# Patient Record
Sex: MALE | Race: WHITE | Employment: FULL TIME | URBAN - METROPOLITAN AREA
[De-identification: names, ages, dates, MRNs, and addresses within clinical notes are randomized per-mention and may not be internally consistent; named-entity substitution may affect disease eponyms.]

---

## 2017-09-12 DIAGNOSIS — M25.511 ACUTE PAIN OF RIGHT SHOULDER: Primary | ICD-10-CM

## 2017-10-04 ENCOUNTER — OFFICE VISIT (OUTPATIENT)
Dept: ORTHOPEDIC SURGERY | Age: 32
End: 2017-10-04

## 2017-10-04 VITALS
SYSTOLIC BLOOD PRESSURE: 139 MMHG | DIASTOLIC BLOOD PRESSURE: 91 MMHG | BODY MASS INDEX: 38.54 KG/M2 | WEIGHT: 310 LBS | HEIGHT: 75 IN | HEART RATE: 82 BPM

## 2017-10-04 DIAGNOSIS — M12.511: ICD-10-CM

## 2017-10-04 DIAGNOSIS — M24.111 LABRAL TEAR OF SHOULDER, DEGENERATIVE, RIGHT: Primary | ICD-10-CM

## 2017-10-04 PROCEDURE — 99214 OFFICE O/P EST MOD 30 MIN: CPT | Performed by: ORTHOPAEDIC SURGERY

## 2017-10-04 PROCEDURE — L3670 SO ACRO/CLAV CAN WEB PRE OTS: HCPCS | Performed by: ORTHOPAEDIC SURGERY

## 2017-10-04 PROCEDURE — G8484 FLU IMMUNIZE NO ADMIN: HCPCS | Performed by: ORTHOPAEDIC SURGERY

## 2017-10-04 PROCEDURE — G8417 CALC BMI ABV UP PARAM F/U: HCPCS | Performed by: ORTHOPAEDIC SURGERY

## 2017-10-04 PROCEDURE — G8427 DOCREV CUR MEDS BY ELIG CLIN: HCPCS | Performed by: ORTHOPAEDIC SURGERY

## 2017-10-04 PROCEDURE — 73030 X-RAY EXAM OF SHOULDER: CPT | Performed by: ORTHOPAEDIC SURGERY

## 2017-10-04 PROCEDURE — 1036F TOBACCO NON-USER: CPT | Performed by: ORTHOPAEDIC SURGERY

## 2017-10-04 NOTE — MR AVS SNAPSHOT
Learn more at: [email or web address]             Medications and Orders      Your Current Medications Are              meloxicam (MOBIC) 15 MG tablet Take 1 tablet by mouth daily      Allergies           No Known Allergies      We Ordered/Performed the following           DJO ultrasling IV Shoulder Sling     Comments:    Patient was prescribed a DJO Ultrasling IV Shoulder Brace. The right shoulder will require stabilization / immobilization from this orthosis. The orthosis will assist in protecting the affected area, provide functional support and facilitate healing. The device was ordered and fit on 10/4/17. The patient was educated and fit by a healthcare professional with expert knowledge and specialization in brace application while under the direct supervision of the treating physician. Verbal and written instructions for the use of and application of this item were provided. They were instructed to contact the office immediately should the brace result in increased pain, decreased sensation, increased swelling or worsening of the condition. XR SHOULDER RIGHT (MIN 2 VIEWS)          Result Summary for XR SHOULDER RIGHT (MIN 2 VIEWS)      Result Information     Status          Final result (Exam End: 10/4/2017  2:08 PM)           10/4/2017  2:09 PM      Narrative & Impression           Radiology exam is complete. No Radiologist dictation. Please follow up with ordering provider. Additional Information        Basic Information     Date Of Birth Sex Race Ethnicity Preferred Language    1985 Male White Non-/Non  English      Problem List as of 10/4/2017  Date Reviewed: 10/4/2017          None      Preventive Care        Date Due    HIV screening is recommended for all people regardless of risk factors  aged 15-65 years at least once (lifetime) who have never been HIV tested.  2/10/1999

## 2017-10-04 NOTE — PROGRESS NOTES
with his previous x-ray last year it appears that the patient may have some prominence of hardware from his initial surgery protruding from his glenoid which is causing some traumatic wear on his humeral head. As such we have offered the patient various treatment options for his overall pain and symptoms. Given the patient's distance from our clinic he has opted not to have the three-part Supartz injection into the shoulder but has decided on having arthroscopic debridement, lysis of adhesions and possible removal of hardware and biceps tenodesis. The risks benefits and alternatives to surgery have been explained to the patient in great detail the patient stated a good understanding of everything was explained and we will schedule the patient for his procedure. Of note the patient will receive a Ultrasling today to be brought with him on his date of surgery. Orders Placed This Encounter   Procedures    XR SHOULDER RIGHT (MIN 2 VIEWS)     Order Specific Question:   Reason for exam:     Answer:   room 2    DJO ultrasling IV Shoulder Sling     Patient was prescribed a DJO Ultrasling IV Shoulder Brace. The right shoulder will require stabilization / immobilization from this orthosis. The orthosis will assist in protecting the affected area, provide functional support and facilitate healing. The device was ordered and fit on 10/4/17. The patient was educated and fit by a healthcare professional with expert knowledge and specialization in brace application while under the direct supervision of the treating physician. Verbal and written instructions for the use of and application of this item were provided. They were instructed to contact the office immediately should the brace result in increased pain, decreased sensation, increased swelling or worsening of the condition.        [address] [place] [address]  Date:    10/4/2017    This dictation was performed with a

## 2017-10-23 ENCOUNTER — TELEPHONE (OUTPATIENT)
Dept: ORTHOPEDIC SURGERY | Age: 32
End: 2017-10-23

## 2017-12-04 ENCOUNTER — TELEPHONE (OUTPATIENT)
Dept: ORTHOPEDIC SURGERY | Age: 32
End: 2017-12-04

## 2017-12-07 ENCOUNTER — HOSPITAL ENCOUNTER (OUTPATIENT)
Dept: PREADMISSION TESTING | Age: 32
Discharge: HOME | End: 2017-12-07
Attending: ORTHOPAEDIC SURGERY | Admitting: ORTHOPAEDIC SURGERY

## 2017-12-07 RX ORDER — CHLORHEXIDINE GLUCONATE 0.12 MG/ML
15 RINSE ORAL 2 TIMES DAILY
Status: CANCELLED | OUTPATIENT
Start: 2017-12-10

## 2017-12-07 RX ORDER — CHLORHEXIDINE GLUCONATE 0.12 MG/ML
15 RINSE ORAL 2 TIMES DAILY
Status: CANCELLED | OUTPATIENT
Start: 2017-12-10 | End: 2017-12-08

## 2017-12-07 NOTE — PROGRESS NOTES
PRE-OP INSTRUCTIONS FOR THE SURGICAL PATIENT YOU ARE UNABLE TO MAKE CONTACT FOR AN INTERVIEW:      1. Follow instructions for your ARRIVAL TIME as DIRECTED BY YOUR SURGEON. 2. Enter the MAIN entrance located on [facility] and report to the desk. 3. Bring your insurance & prescription card and photo ID with you. You may also be asked to pay a co-pay, as you may want to bring a check or credit card with you. 4. Leave all other valuables at home. 5. Arrange for someone to drive you home and be with you for the first 24 hours after discharge. 6. You must contact your surgeon for ALL medication instructions, especially if taking blood thinners, aspirin, or diabetic medication. 7. A Pre-op History and Physical for surgery MUST be completed by your Physician or an Urgent Care within 30 days of your procedure date. Please bring a copy with you on the day of your procedure and along with any other testing performed. 8. DO NOT EAT OR DRINK ANYTHING AFTER MIDNIGHT, including gum, candy, mints or ice chips   9. Dress in loose, comfortable clothing appropriate for redressing after your procedure. Do not wear jewelry (including body piercings), make-up, fingernail polish, lotion, powders or metal hairclips. Contacts will need to be removed prior to surgery. 10. If you use a CPAP, please bring it with you on the day of your procedure. 11. Do not shave or wax for 72 hours prior to procedure near your operative site  12. FOR WOMAN OF CHILDBEARING AGE ONLY- please bring a urine sample with you on day of surgery or make sure we can collect on arrival.    If you have further questions, you may contact us at [phone]    Left instructions on patient's voicemail. Stephie Pat. 12/7/2017 . 2:25 PM

## 2017-12-11 ENCOUNTER — HOSPITAL ENCOUNTER (OUTPATIENT)
Dept: SURGERY | Age: 32
Discharge: HOME | End: 2017-12-11
Admitting: ORTHOPAEDIC SURGERY

## 2017-12-11 VITALS
HEART RATE: 73 BPM | TEMPERATURE: 97.7 F | HEIGHT: 75 IN | BODY MASS INDEX: 38.54 KG/M2 | RESPIRATION RATE: 16 BRPM | DIASTOLIC BLOOD PRESSURE: 80 MMHG | SYSTOLIC BLOOD PRESSURE: 139 MMHG | OXYGEN SATURATION: 93 % | WEIGHT: 310 LBS

## 2017-12-11 RX ORDER — HYDRALAZINE HYDROCHLORIDE 20 MG/ML
5 INJECTION INTRAMUSCULAR; INTRAVENOUS EVERY 10 MIN PRN
Status: DISCONTINUED | OUTPATIENT
Start: 2017-12-11 | End: 2017-12-12 | Stop reason: HOSPADM

## 2017-12-11 RX ORDER — LABETALOL HYDROCHLORIDE 5 MG/ML
5 INJECTION, SOLUTION INTRAVENOUS EVERY 10 MIN PRN
Status: DISCONTINUED | OUTPATIENT
Start: 2017-12-11 | End: 2017-12-12 | Stop reason: HOSPADM

## 2017-12-11 RX ORDER — SODIUM CHLORIDE, SODIUM LACTATE, POTASSIUM CHLORIDE, CALCIUM CHLORIDE 600; 310; 30; 20 MG/100ML; MG/100ML; MG/100ML; MG/100ML
INJECTION, SOLUTION INTRAVENOUS CONTINUOUS
Status: DISCONTINUED | OUTPATIENT
Start: 2017-12-11 | End: 2017-12-12 | Stop reason: HOSPADM

## 2017-12-11 RX ORDER — FENTANYL CITRATE 50 UG/ML
100 INJECTION, SOLUTION INTRAMUSCULAR; INTRAVENOUS ONCE
Status: COMPLETED | OUTPATIENT
Start: 2017-12-11 | End: 2017-12-11

## 2017-12-11 RX ORDER — PROMETHAZINE HYDROCHLORIDE 25 MG/ML
6.25 INJECTION, SOLUTION INTRAMUSCULAR; INTRAVENOUS
Status: ACTIVE | OUTPATIENT
Start: 2017-12-11 | End: 2017-12-11

## 2017-12-11 RX ORDER — ROPIVACAINE HYDROCHLORIDE 5 MG/ML
30 INJECTION, SOLUTION EPIDURAL; INFILTRATION; PERINEURAL ONCE
Status: DISCONTINUED | OUTPATIENT
Start: 2017-12-11 | End: 2017-12-12 | Stop reason: HOSPADM

## 2017-12-11 RX ORDER — MIDAZOLAM HYDROCHLORIDE 1 MG/ML
2 INJECTION INTRAMUSCULAR; INTRAVENOUS ONCE
Status: COMPLETED | OUTPATIENT
Start: 2017-12-11 | End: 2017-12-11

## 2017-12-11 RX ORDER — FENTANYL CITRATE 50 UG/ML
25 INJECTION, SOLUTION INTRAMUSCULAR; INTRAVENOUS EVERY 5 MIN PRN
Status: DISCONTINUED | OUTPATIENT
Start: 2017-12-11 | End: 2017-12-12 | Stop reason: HOSPADM

## 2017-12-11 RX ORDER — MORPHINE SULFATE 2 MG/ML
2 INJECTION, SOLUTION INTRAMUSCULAR; INTRAVENOUS
Status: DISCONTINUED | OUTPATIENT
Start: 2017-12-11 | End: 2017-12-12 | Stop reason: HOSPADM

## 2017-12-11 RX ORDER — OXYCODONE HYDROCHLORIDE AND ACETAMINOPHEN 5; 325 MG/1; MG/1
1 TABLET ORAL EVERY 4 HOURS PRN
Status: DISCONTINUED | OUTPATIENT
Start: 2017-12-11 | End: 2017-12-12 | Stop reason: HOSPADM

## 2017-12-11 RX ORDER — ONDANSETRON 2 MG/ML
4 INJECTION INTRAMUSCULAR; INTRAVENOUS EVERY 6 HOURS PRN
Status: DISCONTINUED | OUTPATIENT
Start: 2017-12-11 | End: 2017-12-12 | Stop reason: HOSPADM

## 2017-12-11 RX ORDER — FENTANYL CITRATE 50 UG/ML
50 INJECTION, SOLUTION INTRAMUSCULAR; INTRAVENOUS EVERY 5 MIN PRN
Status: DISCONTINUED | OUTPATIENT
Start: 2017-12-11 | End: 2017-12-12 | Stop reason: HOSPADM

## 2017-12-11 RX ORDER — ONDANSETRON 2 MG/ML
4 INJECTION INTRAMUSCULAR; INTRAVENOUS
Status: ACTIVE | OUTPATIENT
Start: 2017-12-11 | End: 2017-12-11

## 2017-12-11 RX ADMIN — SODIUM CHLORIDE, SODIUM LACTATE, POTASSIUM CHLORIDE, CALCIUM CHLORIDE: 600; 310; 30; 20 INJECTION, SOLUTION INTRAVENOUS at 10:20

## 2017-12-11 RX ADMIN — FENTANYL CITRATE 100 MCG: 50 INJECTION, SOLUTION INTRAMUSCULAR; INTRAVENOUS at 10:51

## 2017-12-11 RX ADMIN — MIDAZOLAM HYDROCHLORIDE 2 MG: 1 INJECTION INTRAMUSCULAR; INTRAVENOUS at 10:52

## 2017-12-11 ASSESSMENT — PAIN SCALES - GENERAL
PAINLEVEL_OUTOF10: 0
PAINLEVEL_OUTOF10: 0
PAINLEVEL_OUTOF10: 1
PAINLEVEL_OUTOF10: 0

## 2017-12-11 ASSESSMENT — PAIN DESCRIPTION - FREQUENCY: FREQUENCY: CONTINUOUS

## 2017-12-11 ASSESSMENT — PAIN DESCRIPTION - ONSET: ONSET: ON-GOING

## 2017-12-11 ASSESSMENT — PAIN DESCRIPTION - PROGRESSION: CLINICAL_PROGRESSION: GRADUALLY IMPROVING

## 2017-12-11 ASSESSMENT — PAIN DESCRIPTION - DESCRIPTORS: DESCRIPTORS: TIGHTNESS

## 2017-12-11 ASSESSMENT — PAIN - FUNCTIONAL ASSESSMENT: PAIN_FUNCTIONAL_ASSESSMENT: 0-10

## 2017-12-11 ASSESSMENT — PAIN DESCRIPTION - LOCATION: LOCATION: FINGER (COMMENT WHICH ONE)

## 2017-12-11 ASSESSMENT — PAIN DESCRIPTION - ORIENTATION: ORIENTATION: RIGHT

## 2017-12-11 ASSESSMENT — PAIN DESCRIPTION - PAIN TYPE: TYPE: SURGICAL PAIN

## 2017-12-11 NOTE — PROGRESS NOTES
Patient admitted to PACU #9 from OR per stretcher at 1339 s/p  RIGHT SHOULDER EUA, DIAGNOSTIC ARTHROSCOPY, ARTHROSCOPIC DEBRIDEMENT, SUBACROMIAL DECOMPRESSION, OPEN BICEPS TENODESIS, LYSIS OF  ADHESIONS, REMOVAL OF SUTURE, ARTHROSCOPIC REMOVAL LOOSE BODIES, ARTHROSCOPIC PARTIAL SYNOVECTOMY. Report received at bedside in PACU per CRNA. Patient was reported to be hemodynamically stable during surgery with no complications reported. Patient connected to PACU monitoring equipment. IVF's infusing with site unremarkable. Patient arrived to PACU not responsive from anesthesia but with respirations easy, even and regular. No complaints of pain noted or verbalized. Right shoulder surgical dressing remains C,D,I with no drainage noted. Ice packs applied. Right arm in abductor sling with pillow behind right elbow. No further changes noted.  Will continue to monitor.

## 2017-12-11 NOTE — H&P
Kirk Zheng    [id number]    [facility] Same Day Surgery Update H & P  Department of General Surgery   Surgical Service   CNP Pre-operative History and Physical  Last H & P within the last 30 days. DIAGNOSIS:   RIGHT SHOULDER LABRAL TEAR M24.11    PROCEDURE:  Right Shoulder Arthroscopy Debridement Decompression Biceps Tenodesis Lysis Of Adhesions      HISTORY OF PRESENT ILLNESS:  A 34year old male patient with history of right shoulder pain, stiffness, limited ROM, and weakness. Symptoms not relieved by conservative treatment. Patient here today for surgical intervention. Please see initial H & P     Past Medical History:    No past medical history on file. Past Surgical History:    No past surgical history on file. Past Social History:  Social History     Social History    Marital status:      Spouse name: N/A    Number of children: N/A    Years of education: N/A     Social History Main Topics    Smoking status: Never Smoker    Smokeless tobacco: Not on file    Alcohol use Not on file    Drug use: Unknown    Sexual activity: Not on file     Other Topics Concern    Not on file     Social History Narrative    No narrative on file         Medications Prior to Admission:      Prior to Admission medications    Medication Sig Start Date End Date Taking? Authorizing Provider   meloxicam (MOBIC) 15 MG tablet Take 1 tablet by mouth daily 11/16/16   Grey Ovalle MD         Allergies:  Review of patient's allergies indicates no known allergies.     PHYSICAL EXAM:      BP (!) 149/88   Pulse 66   Temp 98 °F (36.7 °C) (Oral)   Resp 18   Ht 6' 3\" (1.905 m)   Wt (!) 310 lb (140.6 kg)   SpO2 97%   BMI 38.75 kg/m²      Heart:  regular rate and rhythm, no murmur noted on exam    Lungs:  No increased work of breathing, good air exchange, clear to auscultation bilaterally, no crackles or wheezing    Abdomen:  soft, non-distended, non-tender, no rebound tenderness or guarding, normal active bowel sounds, no masses palpated and no hepatosplenomegaly    ASSESSMENT AND PLAN:    1. Patient seen and focused exam done today- no new changes since last physical exam on 12/6/2017    2. Access to ancillary services are available per request of the provider.     Holley Blood CNP     12/11/2017

## 2017-12-11 NOTE — PROGRESS NOTES
Time out  Done for preop  Right interscalene block for post op pain control  Site marked   IV meds given  Block completed and pt tolerated  Procedure well  O2 2-3 L/cannula  Vs stable,cont.  monitoring

## 2017-12-11 NOTE — ANESTHESIA PRE-OP
ANESTHESIA PRE-OP NOTE    NAME: Francene Hodgkins  : 1985  AGE: 34 y.o.  MED. REC. #: [id number]    DOS: 17       PROCEDURE:  Right Shoulder Arthroscopy Debridement Decompression Biceps Tenodesis Lysis Of Adhesions              SURGEON: Kendall Barreto    HPI: 33 yo M with R shoulder pain    ALLERGIES: Review of patient's allergies indicates no known allergies. Height: 6' 3\" (190.5 cm) Weight: (!) 310 lb (140.6 kg)  Vitals:    17 0940   BP: (!) 149/88   Pulse: 66   Resp: 18   Temp: 98 °F (36.7 °C)   SpO2: 97%      MEDICATIONS:  Patient's Medications   New Prescriptions    No medications on file   Previous Medications    MELOXICAM (MOBIC) 15 MG TABLET    Take 1 tablet by mouth daily   Modified Medications    No medications on file   Discontinued Medications    No medications on file      ASA STATUS: 1  NPO since: > 8 hrs   Patient identified/chart reviewed: yes  CV:   no HTN    no HLD    no CAD   PULMONARY: no  Asthma    no COPD   no HARINDER   ENDOCRINE: no DM     no Thyroid Disease   GI: no GERD   : no known renal disease   NEURO/PSYCH: no CVA   no Seizure Disorder   MUSCULOSKELETAL: no DJD   HEME/ONC: no DVT  no PE   no Anemia      OTHER:   EKG:   Echocardiogram:   COMMENTS:        PSH:  has no past surgical history on file. There is no problem list on file for this patient. PMH:  No past medical history on file. PERSONAL/FAMILY ANESTHESIA PROBLEMS: no     AIRWAY ASSESSMENT:  MALLAMPATI: I DENTITION: no chipped or loose teeth ROM: full     ANESTHETIC PLAN: GA with standard ASA monitors    If german-operative block planned, describe: interscalene nerve block    CONSENT: Risks/benefits/options/questions discussed.  Patient agrees:  yes

## 2017-12-11 NOTE — ANESTHESIA POST-OP
Anesthesia Post-op Note    Patient: Rodney Garcia    Procedure(s) Performed: R shoulder arthroscopy, open biceps tenodesis     DOS: 12.11.17    Surgeon: Ye William     Anesthesia type: general + IS nerve blocl    Post-op assessment:  Anesthetic Problems: no   Last Vitals:    Vitals:    12/11/17 1445   BP: (!) 143/77   Pulse: 66   Resp: 19   Temp:    SpO2: 93%     Cardiovascular System Stable: yes  Respiratory Function: Airway Patent yes  ETT no  Ventilator no  Level of consciousness: awake, alert and oriented  Post-op pain: adequate analgesia  Hydration Adequate: yes  Nausea/Vomiting:no  Other Issues:

## 2017-12-11 NOTE — PROGRESS NOTES
[facility] PACU Education and Care Plan Goals  The following items will be achieved upon completion of the patient's transfer or discharge from the PACU:    Post Operative Pain Management                                                                               [x] Patient will verbalize understanding of pain scale and pain management. [x] Patient achieves predetermined pain goal of 4. [x] Self reports a comfort level acceptable for discharge to home.   [] Other     Fall Risk Potential  [x] Due to Perioperative medication administration  Additional Risk Identified:   [] Sensory deficit         [] Motor deficit         [] Balance problem         [] Home medication         [] Uses assistive device to ambulate    Goal(s) for fall prevention:  [x] Prevent fall or injury by calling for assistance with activity and use of siderails while hospitalized  [x] Prevent fall or injury by using assistance with activity after discharge to home.  [] Patient / Significant other verbalize understanding in use of any ordered assistive devices    Mobility Safety/ ADL  [x] Reach a functional mobility goal within limitations of the procedure. Infection Precautions                                                                                                            [x]Patient understands implementation of infection precautions (see [facility] Presurgical Instructions and SSI Prevention Handout)    Post operative Assessment and Care                                                             [x] Standards of care met as delineated by ASPAN.                                                               Discharge Education and Goals  [x]Patient voices understanding of PACU discharge criteria  [x]Outpatient / significant other voices understanding of home care and follow up procedures (See [facility] Procedure Discharge Instructions)  [x] Patient / significant other understanding of Special Needs:  [] Cooling device  []Wound Support Device  [] Crutches   []Drain    [] Walker   [x]Other: BLE thigh high REECE hose, SCD Sleeves, Abductor sling, Ice packs   [] Inpatient / significant other understands the plan for transfer to the inpatient unit

## 2017-12-11 NOTE — PROGRESS NOTES
over 101°    - Redness, swelling, hardness or warmth at the operative site  - Foul smelling or cloudy drainage at the operative site   - Unrelieved pain  - Unrelieved nausea  - Blood soaked dressing. (Some oozing may be normal)  - Inability to urinate      - Numb, pale, blue, cold or tingling extremity      Physician:  DR Renetta Peña    The above instructions were reviewed with patient/significant other. The following additional patient specific information was reviewed with the patient/significant other:  [x]Procedure/physician specific instructions  []Medication information sheet(S)  []AshleyS egress test  []Pain Ball management  []FAQ Catheter associated blood stream infections  []FAQ Surgical Site Infections  []Other-    I have read and understand the instructions given to me: ____________________________________________   (Patient/S.O. Signature)            Date/time 12/11/2017 3:44 PM         PACU:  [phone]   M-F 700 AM - 7 PM      SAME DAY SERVICES:  [phone] M-F 7AM-6PM        If you smoke STOP. We care about your health!

## 2017-12-12 NOTE — OP NOTE
synovitis on the labrum and rotator cuff that could be removed. There was no high-grade partial or full thickness rotator cuff tear. None  of the anchors were loose. Exploration of the subacromial space showed dense  subdeltoid and subacromial bursitis, type 2 acromion amenable to an   Acromioplasty, intact cuff. The biceps was swollen and tendinopathic with  tenosynovitis. There were some loose bodies in the bicipital groove, we  could do a tenotomy and then open subpectoral biceps tenodesis. There were  no other obvious anomalous findings. BRIEF HISTORY AND PRESENTING ILLNESS:  The patient is a 76-year-old  gentleman, well known, because over 13 years ago I performed an  arthroscopic debridement and stabilization after failed open stabilization  elsewhere. He did well for awhile, started having worsening pain,  stiffness. X-rays showed osteoarthritis. We tried to manage this  conservatively. Finally, he got to the point that he was first recommended  arthroscopic debridement. I felt he was too young for joint replacement;  however, he still had some residual joint space and cartilage flap. He  understood we would address concurrent lesions such as decompression,  biceps tenodesis, as well as remove any loose bodies and spurs as much as  we could. He understood the risks such as bleeding, infection, anesthetic  risks, injury to nerve and blood vessel, stiffness, weakness, incomplete  pain relief, and need for further surgery. He understood that he would  still have arthritis and later on life he will certainly need a total  shoulder replacement. He understood all of this and gave written informed  consent. He was scheduled on an elective basis after preoperative medical  clearance. OPERATIVE PROCEDURE:  On the day of the procedure, the patient was brought  back to the operating room, placed supine on the operating table. General  anesthesia was established.   Preoperative antibiotics and were removed from the humeral side. Further  posterior, we could see some sutures from the prior capsulorrhaphy and  these were removed. We smoothed out the contour from native cartilage to  the osteophyte. The axillary nerve was out of harm's way as we did not do  a capsulectomy inferiorly. We did all the synovectomy and removed all the  osteophytes. Posterior ligaments were degenerative and fraying. This was  left alone. Above the biceps, we also did release. We probed the biceps,  it was quite swollen and edematous and red, so we elected to proceed with  tenotomy as a first step for tenodesis. Percutaneously, we placed an  18-gauge spinal needle through the biceps tendon. We passed #1 PDS suture  through the needle, withdrew the needle, withdrew the suture anteriorly. More medial to that, using upbiting basket, we tenotomized the biceps  tendon right at the supraglenoid tubercle. We debrided the stump using a  shaver. We also resected some synovitis from surface of the rotator cuff. We inspected rotator cuff in abduction and external rotation. There was no  high-grade partial or full thickness tear. This completed our  intraarticular work. The only thing remaining was that there was a  cartilage flap in the humeral side. This was removed using a ring curette. The focal high-grade cartilage loss defect remained. There was only 1 cm x  5 or 6 cm. It was felt that this was too small to do a microfracture. We  then redirected the arthroscope in the same posterior portal above the  rotator cuff and into the subacromial space. We established a lateral  portal under direct visualization and dilated the portal with arthroscopic  shaver and performed our thorough bursectomy and complete bursectomy using  ArthroCare radiofrequency thermal ablator. This was used to resect the  periosteum at the undersurface of the acromion, gently teased off the  coracoacromial ligament.   We also exposed the type 2 acromion. We placed  arthroscope laterally, completed bursectomy posterior and posterolaterally  with shaver and cautery device. We resected the periosteum and made sure  that we had exposure at the anterior osteophytes. We then used  acromionizer estrella from posterior. We did a limited acromioplasty to covert  the undersurface of the acromion to a flat type 1 acromion. We also  removed some of the osteophytes on the acromion side at the [facility] joint. This  was done with a estrella debriding from anterior. We removed the additional  bony debris and completed the bursectomy with a shaver as needed. We  inspected bursa and rotator cuff and it was intact. The humeral scapular  motion interface was free to rotation and we checked this. The deltoid was  left alone. This completed our arthroscopic work. We then made a 3 cm  longitudinal incision over the pectoralis muscle underlying and overlying  the principal axillary crease. This actually extended just inferior to be  free with open incision. We used a 15 blade to go through the skin and  subcutaneous tissue. We went through the deep subcutaneous tissue with  Metzenbaum scissors. We also released the deep fascia in line with the  pectoralis while retracting it cephalad. We placed the Hohmann retractor  to retract the conjoint muscles. We internally rotated the arm and  delivered the long head tendon. This was delivered using a joker elevator. We removed all of the tenosynovium with a sponge. We placed a #2 FiberLoop  suture in a locking grasping fashion starting at muscle tendon junction. Working our way proximally, five passes were made in the tendon proximally  that was excised. We tied them all, brining out both ends of the suture  exiting the tendon. We then identified the tenodesis site high within the  bicipital groove. We drilled our guidewire to both cortices, reamed over  the guidewire with a 7.5 diameter reamer through the anterior cortex.

## 2017-12-18 ENCOUNTER — OFFICE VISIT (OUTPATIENT)
Dept: ORTHOPEDIC SURGERY | Age: 32
End: 2017-12-18

## 2017-12-18 VITALS
BODY MASS INDEX: 38.54 KG/M2 | WEIGHT: 310 LBS | DIASTOLIC BLOOD PRESSURE: 84 MMHG | HEART RATE: 67 BPM | SYSTOLIC BLOOD PRESSURE: 133 MMHG | HEIGHT: 75 IN

## 2017-12-18 DIAGNOSIS — Z98.890 S/P SHOULDER SURGERY: Primary | ICD-10-CM

## 2017-12-18 PROCEDURE — 99024 POSTOP FOLLOW-UP VISIT: CPT | Performed by: PHYSICIAN ASSISTANT

## 2017-12-18 RX ORDER — ACETAMINOPHEN 500 MG
500 TABLET ORAL EVERY 6 HOURS PRN
Status: ON HOLD | COMMUNITY
End: 2022-10-28 | Stop reason: HOSPADM

## 2017-12-20 NOTE — PROGRESS NOTES
Review of Systems   All other systems reviewed and are negative.
y.o. yr old patient underwent a right shoulder arthroscopy with extensive debridement, lysis of adhesion, arthroscopic removal of a chondral flap, removal of loose bodies subacromial decompression, extensive bursectomy and open subpectoral biceps tenodesis on 12/11/17         Impression:  Encounter Diagnosis   Name Primary?  S/P shoulder surgery Yes       Office Procedures:  No orders of the defined types were placed in this encounter. Treatment Plan:    Overall the patient is doing well. The pain is well-controlled. We recommend that he wear the UltraSling brace at all times with the exception of clothing, bathing of physical therapy until he is at least 2 weeks postop after that he may remove the sling when he is inside of his home but would need to continue wearing it when in a crowd her at risk. The patient was told that he is restricted from driving for at least 2 weeks postop. We will give a print out of their physical therapy order. All of his questions were fully answered today. We would like to see him back in 2 weeks for follow-up visit. Trixie Bloom PA-C  Orthopaedic Sports Medicine Physician Assistant    This dictation was performed with a verbal recognition program [facility]) and it was checked for errors. It is possible that there are still dictated errors within this office note. If so, please bring any errors to my attention for an addendum. All efforts were made to ensure that this office note is accurate.

## 2018-01-03 ENCOUNTER — OFFICE VISIT (OUTPATIENT)
Dept: ORTHOPEDIC SURGERY | Age: 33
End: 2018-01-03

## 2018-01-03 VITALS
WEIGHT: 310 LBS | SYSTOLIC BLOOD PRESSURE: 139 MMHG | DIASTOLIC BLOOD PRESSURE: 79 MMHG | BODY MASS INDEX: 38.54 KG/M2 | HEIGHT: 75 IN | HEART RATE: 67 BPM

## 2018-01-03 DIAGNOSIS — Z98.890 HISTORY OF ARTHROSCOPY OF RIGHT SHOULDER: Primary | ICD-10-CM

## 2018-01-03 PROCEDURE — 99024 POSTOP FOLLOW-UP VISIT: CPT | Performed by: ORTHOPAEDIC SURGERY

## 2018-01-03 NOTE — LETTER
Electrical stimulation      Paraffin   [employer]   TENS        Supervised physical therapy  Frequency:   1x week   2x week   3x week   Other:   Duration:  2 weeks    4 weeks   6 weeks   Other:     Additional Instructions:   Maintain achieved motion, global stabilizing and CF strengthening. Biceps tendon stress.

## 2018-01-04 NOTE — PROGRESS NOTES
Date:  2018    Name:  Keily Nichole  Address:  [place] [address]    :  1985      Age:   34 y.o.    SSN:  [id number]      Medical Record Number:  [id number]    Reason for Visit:    Chief Complaint    Post-Op Check (right shoulder sx )      DOS:1/3/2018     HPI: Keily Nichole is a 34 y.o. male here today for follow-up of his right shoulder pain. He is just over 3 weeks status post right shoulder arthroscopy with subpectoral tenodesis. He's been working on physical therapy. He is slowly been weaning out of the sling. He is still not been working back to work. He is satisfied with his progress so far though he is anxious to get back more activity. Pain Assessment  Location of Pain: Shoulder  Location Modifiers: Right  Severity of Pain: 2  Quality of Pain: Aching, Dull  Result of Injury: No  Work-Related Injury: No  Are there other pain locations you wish to document?: No     ROS: All systems reviewed on patient intake form. Pertinent items are noted in HPI. Past Medical History:   Diagnosis Date    Hypertension 2017    preop undiagnosed    Overweight         Past Surgical History:   Procedure Laterality Date    OTHER SURGICAL HISTORY Right 2017    RIGHT SHOULDER EUA, DIAGNOSTIC ARTHROSCOPY, ARTHROSCOPIC    SHOULDER SURGERY Right 2017    shoulder surgery x3    TONSILLECTOMY AND ADENOIDECTOMY      WISDOM TOOTH EXTRACTION         History reviewed. No pertinent family history.     Social History     Social History    Marital status:      Spouse name: N/A    Number of children: N/A    Years of education: N/A     Social History Main Topics    Smoking status: Never Smoker    Smokeless tobacco: Never Used    Alcohol use Yes      Comment: occasionally    Drug use: No    Sexual activity: Yes     Partners: Female     Other Topics Concern    None     Social History Narrative    None       Current Outpatient Prescriptions   Medication Sig

## 2018-02-07 ENCOUNTER — OFFICE VISIT (OUTPATIENT)
Dept: ORTHOPEDIC SURGERY | Age: 33
End: 2018-02-07

## 2018-02-07 VITALS
WEIGHT: 310 LBS | HEIGHT: 75 IN | DIASTOLIC BLOOD PRESSURE: 80 MMHG | HEART RATE: 68 BPM | BODY MASS INDEX: 38.54 KG/M2 | SYSTOLIC BLOOD PRESSURE: 120 MMHG

## 2018-02-07 DIAGNOSIS — Z98.890 S/P SHOULDER SURGERY: Primary | ICD-10-CM

## 2018-02-07 PROCEDURE — 99024 POSTOP FOLLOW-UP VISIT: CPT | Performed by: ORTHOPAEDIC SURGERY

## 2018-02-07 NOTE — PROGRESS NOTES
Referral Type:   Consult for Advice and Opinion     Referral Reason:   Patient Preference     Requested Specialty:   Physical Therapy     Number of Visits Requested:   1       Treatment Plan:  He continues to progress well post-operatively. We discussed the importance of him continuing to work on stretches to increase and maintain his range of motion. He may begin some light biceps work and rotator cuff strengthening. A physical therapy order was placed in [facility] and given to the patient today. His questions were answered today to his satisfaction. We will see him back in 4-5 weeks, or sooner if needed. 12:14 PM          Mago Lazar PA-C  Physician Assistant, Orthopaedic Surgery and Sports Medicine    During this examination, Mago Lazar PA-C, functioned as a scribe for Dr. Melly Abdullahi. This dictation was performed with a verbal recognition program (DRAGON) and it was checked for errors. It is possible that there are still dictated errors within this office note. If so, please bring any errors to my attention for an addendum. All efforts were made to ensure that this office note is accurate.  _______________  I, Dr. Melly Abdullahi, personally performed the services described in this documentation as described by Mago Lazar PA-C in my presence, and it is both accurate and complete. Lori Morgan MD, PhD  2/7/2018

## 2018-02-07 NOTE — LETTER
Perform exercises for:   15     minutes    3      times/day   Supervised physical therapy  Frequency:   1x week   2x week   3x week   Other:   Duration:  2 weeks    4 weeks   6 weeks   Other:     Additional Instructions:

## 2018-03-21 ENCOUNTER — OFFICE VISIT (OUTPATIENT)
Dept: ORTHOPEDIC SURGERY | Age: 33
End: 2018-03-21

## 2018-03-21 VITALS
SYSTOLIC BLOOD PRESSURE: 119 MMHG | BODY MASS INDEX: 38.54 KG/M2 | DIASTOLIC BLOOD PRESSURE: 78 MMHG | WEIGHT: 309.97 LBS | HEIGHT: 75 IN | HEART RATE: 53 BPM

## 2018-03-21 DIAGNOSIS — Z98.890 S/P SHOULDER SURGERY: Primary | ICD-10-CM

## 2018-03-21 PROCEDURE — G8427 DOCREV CUR MEDS BY ELIG CLIN: HCPCS | Performed by: ORTHOPAEDIC SURGERY

## 2018-03-21 PROCEDURE — 1036F TOBACCO NON-USER: CPT | Performed by: ORTHOPAEDIC SURGERY

## 2018-03-21 PROCEDURE — 99213 OFFICE O/P EST LOW 20 MIN: CPT | Performed by: ORTHOPAEDIC SURGERY

## 2018-03-21 PROCEDURE — G8417 CALC BMI ABV UP PARAM F/U: HCPCS | Performed by: ORTHOPAEDIC SURGERY

## 2018-03-21 PROCEDURE — G8484 FLU IMMUNIZE NO ADMIN: HCPCS | Performed by: ORTHOPAEDIC SURGERY

## 2018-03-21 NOTE — PROGRESS NOTES
History of Present Illness:  Chapin Segura is a 65-year-old male here today for a follow-up of his right shoulder. He is 14 weeks status post right shoulder arthroscopy with extensive debridement, lysis of adhesion, arthroscopic removal of chondral flap, removal of loose bodies, subacromial decompression, extensive bursectomy and open subpectoral biceps tenodesis on 12/11/17. He does feel his shoulder continues to improve. He does not feel that his shoulder is limiting his activities. He denies fever, chills or any other problems at this time. He denies new injury. He denies numbness and tingling in his upper extremities. PAIN ASSESSMENT:   Pain Assessment  Location of Pain: Shoulder  Location Modifiers: Right  Severity of Pain: 1  Quality of Pain: Other (Comment) (nothing really)  Frequency of Pain: Other (Comment) (nothing)  Aggravating Factors: Other (Comment) (just get tired and some ROM)  Limiting Behavior: No  Relieving Factors: Other (Comment) (nothing)  Result of Injury: No  Work-Related Injury: No  Are there other pain locations you wish to document?: No    Medical History:  Patient's medications, allergies, past medical, surgical, social and family histories were reviewed and updated as appropriate. Pertinent items are noted in HPI  Review of systems reviewed from Patient History Form dated on 12/20/17 and available in the patient's chart under the Media tab. Vital Signs:  Vitals:    03/21/18 1142   BP: 119/78   Pulse: 53     Constitutional:  Alert and oriented in no apparent distress. Normal mood and affect. Skin:  There are no skin lesions, cellulitis, or extreme edema. The patient has warm and well-perfused Bilateral upper extremities with brisk capillary refill. RIGHT Shoulder Examination:    Inspection:  Incisions well healed, no drainage, no erythema, no ecchymosis, no signs of infection. No gross deformity. Palpation:  Nontender. No crepitus with range of motion.     Active Range of Motion: 155° forward elevation and abduction. Internal rotation to L2 (T12 on the left side)    Strength:  5/5 abduction, external rotation with elbow at side, internal rotation and supraspinatus    Special Tests:  No Yosef muscle deformity    Neurovascular: Sensation to light touch is intact, no motor deficits, palpable radial pulses 2+    Self assessment questionnaires were completed today. Radiology:     No new imaging studies were obtained today. Assessment : Mr. Sharyle Sport is a 28 y.o. yr old patient  14 weeks status post right shoulder arthroscopy with extensive debridement, lysis of adhesions, arthroscopic removal of chondral flap, removal of loose bodies, subacromial decompression, extensive bursectomy and open subpectoral biceps tenodesis on 12/11/17 doing very well. Impression:  Encounter Diagnosis   Name Primary?  S/P right shoulder surgery Yes       Office Procedures:  No orders of the defined types were placed in this encounter. Treatment Plan:  Ari Russell is doing very well postoperatively. He exhibits good range of motion and strength and at this time I do feel that we can release him back to his regular physical activities. I will follow-up with him on a as needed basis. I did discuss with him I would like to see him once every 2 years with new radiographs to monitor his right shoulder. All questions were answered today to the patients apparent satisfaction. The patient was encouraged to call the office with any further questions or concerns. Sharyle Sport is in agreement with this plan. 11:56 AM          Tom Ruelas PA-C  Physician Assistant, Orthopaedic Surgery and Sports Medicine    During this examination, Tom Ruelas PA-C, functioned as a scribe for Dr. Win Jenkins. This dictation was performed with a verbal recognition program (DRAGON) and it was checked for errors. It is possible that there are still dictated errors within this office note.   If so, please

## 2022-07-07 ENCOUNTER — OFFICE VISIT (OUTPATIENT)
Dept: ORTHOPEDIC SURGERY | Age: 37
End: 2022-07-07
Payer: COMMERCIAL

## 2022-07-07 VITALS — WEIGHT: 309 LBS | HEIGHT: 75 IN | BODY MASS INDEX: 38.42 KG/M2

## 2022-07-07 DIAGNOSIS — M25.511 RIGHT SHOULDER PAIN, UNSPECIFIED CHRONICITY: Primary | ICD-10-CM

## 2022-07-07 PROCEDURE — 20610 DRAIN/INJ JOINT/BURSA W/O US: CPT | Performed by: ORTHOPAEDIC SURGERY

## 2022-07-07 PROCEDURE — 99203 OFFICE O/P NEW LOW 30 MIN: CPT | Performed by: ORTHOPAEDIC SURGERY

## 2022-07-07 RX ORDER — LIDOCAINE HYDROCHLORIDE 10 MG/ML
8 INJECTION, SOLUTION INFILTRATION; PERINEURAL ONCE
Status: COMPLETED | OUTPATIENT
Start: 2022-07-07 | End: 2022-07-07

## 2022-07-07 RX ORDER — VALACYCLOVIR HYDROCHLORIDE 1 G/1
TABLET, FILM COATED ORAL
COMMUNITY
Start: 2022-05-26 | End: 2022-08-19

## 2022-07-07 RX ORDER — TRIAMCINOLONE ACETONIDE 40 MG/ML
80 INJECTION, SUSPENSION INTRA-ARTICULAR; INTRAMUSCULAR ONCE
Status: COMPLETED | OUTPATIENT
Start: 2022-07-07 | End: 2022-07-07

## 2022-07-07 RX ADMIN — LIDOCAINE HYDROCHLORIDE 8 ML: 10 INJECTION, SOLUTION INFILTRATION; PERINEURAL at 13:59

## 2022-07-07 RX ADMIN — TRIAMCINOLONE ACETONIDE 80 MG: 40 INJECTION, SUSPENSION INTRA-ARTICULAR; INTRAMUSCULAR at 14:00

## 2022-07-07 NOTE — PROGRESS NOTES
12 [facility]  History and Physical  Shoulder Pain    Date:  2022    Name:  Michaela De León  Address:  [facility] [address] [facility] [address]    :  1985      Age:   44 y.o.    SSN:  [id number]      Medical Record Number:  [id number]    Reason for Visit:    Shoulder Pain (OP/SP RIGHT SHOULDER)      HPI:   Michaela De León is a 44 y.o. male who presents to our office today complaining of  right shoulder pain. This patient is a longtime patient of  Dr. César Ng. The patient has a history of undergoing multiple surgeries on both shoulders. He underwent revision stabilization of the right shoulder and primary stabilization of the left shoulder nearly 20 years ago. The most recent surgery was done on 2017 for extensive debridement and open subpectoral biceps tenodesis of the right shoulder. Patient reports since his surgery he has been doing quite well and has returned back to most of his activities. Patient reports he tries to stay active and play sports when he can. Patient has noticed a progressive gradual decline in his overall range of motion. She does have a deep achiness within the shoulder with certain movements. Patient reports lying on the right side along with lifting or doing push-ups can really aggravate his shoulders. Denies any dislocation episodes or any new injury since his last surgery. Hx of Right shoulder dislocation. -right arthroscopic labral repair with Dr. Feliciano Srivastava  -revision right arthroscopic labral repair with Dr. César Ng  2017-right shoulder arthroscopic debridement, removal of loose bodies, subacromial decompression with open subpectoral biceps tenodesis    Pain Assessment  Location of Pain: Shoulder  Location Modifiers: Right  Severity of Pain: 3  Quality of Pain: Dull,Sharp  Duration of Pain: Persistent  Frequency of Pain: Constant  Aggravating Factors:  Other (Comment),Stretching,Straightening,Exercise  Limiting Behavior: Yes  Relieving Factors: Rest,Ice,Other (Comment)  Result of Injury: No  Work-Related Injury: No  Are there other pain locations you wish to document?: No    Review of Systems:  A 14 point review of systems available in the scanned medical record as documented by the patient. The review is negative with the exception of those things mentioned in the History of Present Illness and Past Medical History. Past History:  Past Medical History:   Diagnosis Date    Hypertension 12/11/2017    preop undiagnosed    Overweight      Past Surgical History:   Procedure Laterality Date    OTHER SURGICAL HISTORY Right 12/11/2017    RIGHT SHOULDER EUA, DIAGNOSTIC ARTHROSCOPY, ARTHROSCOPIC    SHOULDER SURGERY Right 12/11/2017    shoulder surgery x3    TONSILLECTOMY AND ADENOIDECTOMY      WISDOM TOOTH EXTRACTION       Current Outpatient Medications on File Prior to Visit   Medication Sig Dispense Refill    valACYclovir (VALTREX) 1 g tablet TAKE 1 TABLET BY MOUTH TWICE A DAY      acetaminophen (TYLENOL) 500 MG tablet Take 500 mg by mouth every 6 hours as needed for Pain       No current facility-administered medications on file prior to visit.      Social History     Socioeconomic History    Marital status:      Spouse name: Not on file    Number of children: Not on file    Years of education: Not on file    Highest education level: Not on file   Occupational History    Not on file   Tobacco Use    Smoking status: Never Smoker    Smokeless tobacco: Never Used   Substance and Sexual Activity    Alcohol use: Yes     Comment: occasionally    Drug use: No    Sexual activity: Yes     Partners: Female   Other Topics Concern    Not on file   Social History Narrative    Not on file     Social Determinants of Health     Financial Resource Strain:     Difficulty of Paying Living Expenses: Not on file   Food Insecurity:     Worried About Running Out of Food in the Last Year: Not on file    Debbie andersen bilaterally reveals all areas to be without enlargement or induration. Vascular: Examination reveals no swelling or calf tenderness. Peripheral pulses are palpable and 2+. Neurological: The patient has good coordination. There is no weakness or sensory deficit. Neuro: alert. Oriented X 3  Eyes: Extra-ocular muscles intact  Mouth: Oral mucosa moist. No perioral lesions  Pulm: Respirations unlabored and regular. right Shoulder Exam:  Inspection:  No gross deformities, no signs of infection. Palpation: He has crepitus to passive movement. He has mild tenderness over the rotator cuff footprint, no tenderness in the bicipital groove. No tenderness at the [facility] joint. Active Range of Motion: Forward elevation of 140, abduction of 120, external rotation with elbow at the side 20, internal rotation to the back is L3 versus T9    Passive Range of Motion: Passively forward elevation can be further increased to 140. Strength:  5/5 external/internal rotation with resistance    Special Tests:   No Yosef muscle deformity. Neurovascular: Sensation to light touch is intact, no motor deficits, palpable radial pulses 2+  Comparison left Shoulder Examination:    Inspection:   No gross deformities, no signs of infection. Palpation: He has no tenderness over the rotator cuff footprint or bicipital groove. Active Range of Motion: Forward elevation of 160, abduction of 160, external rotation with elbow at the side 20, internal rotation to the back is T9    Passive Range of Motion: deferred    Strength:  5/5 external/internal rotation with resistance    Special Tests:   No Yosef muscle deformity. Neurovascular: Sensation to light touch is intact, no motor deficits, palpable radial pulses 2+    Laboratory:  No visits with results within 14 Day(s) from this visit. Latest known visit with results is:   No results found for any previous visit.       No results found for this or any previous visit (from the past 24 hour(s)). Radiographic:  3 xray views of the right  shoulder including True AP in internal and external and axillary lateral were taken in our office today reveals postsurgical changes from a prior labral repair. There is advanced narrowing and degenerative changes within the glenohumeral joint. There is wearing on both the glenoid and the humeral side. He has a bone spur over the inferior humeral head and the inferior glenoid. No fractures, dislocations, visible tumors, or signs of acute trauma. Self assessment questionnaires including ASES and Simple Shoulder Test were completed today. Assessment:  William Llamas is a 44 y.o. 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 Impression:  Encounter Diagnosis   Name Primary?  Right shoulder pain, unspecified chronicity Yes       Office Procedures:  Orders Placed This Encounter   Procedures    XR SHOULDER RIGHT (MIN 2 VIEWS)     Standing Status:   Future     Number of Occurrences:   1     Standing Expiration Date:   7/6/2023     After discussing the risks and benefits of a corticosteroid injection, Aleskander ramos did state an understanding and gave verbal consent to proceed. After cleansing the injection site with Chlora-prep and using aseptic techniques,  2 CC of Kenalog 40mg/ml and 8 CC of 1% lidocaine were injected in the right glenohumeral space. He  tolerated the procedure well with no immediate adverse sequelae after the injection. A bandage was placed over the injection site. Appropriate post injections instructions were given to the patient. Plan: We had a lengthy discussion with William Llamas today. Eventually he we will need to consider a anatomic total shoulder replacement vs ream and run at some point in his future.   When that time occurs we we will do our best to do a hemiarthroplasty with a short stem along with a ream and run procedure. This would be based on the degree of joint deformity as determined by updated CT scan. In the meantime we can certainly consider an arthroscopy to release some scar tissue and remove some bone spur however we feel that this would not have a tremendous impact in his overall range of movement motion. We feel that he would benefit with maintaining discipline with his home exercise program focused on stretches and flexibility. He may consider taking oral anti-inflammatory agents and certainly a corticosteroid injection can be considered since he has not had 1 in a very long time. He was agreeable to this plan. All his questions were fully answered today. We would like to see him back in 6 weeks for reassessment. He was agreeable to that. Procedure Note:   Risks and benefits of a corticosteroid injection were discussed with Aleksander ramos. 80 milligrams of Depo Medrol and 8 CC of 1% lidocaine were injected in the right shoulder glenohumeral joint following chlorhexidine prep. He  tolerated the procedure well with no immediate adverse sequelae after the injection. 7/7/2022  12:56 PM      Laura Frederick PA-C  Orthopaedic Sports Medicine Physician Assistant    During this examination, I, Laura Frederick PA-C, functioned as a scribe for Dr. Christy Hanson. This dictation was performed with a verbal recognition program (DRAGON) and it was checked for errors. It is possible that there are still dictated errors within this office note. If so, please bring any errors to my attention for an addendum. All efforts were made to ensure that this office note is accurate.  _______________  I, Dr. Christy Hanson, personally performed the services described in this documentation as described by Laura Frederick PA-C in my presence, and it is both accurate and complete. Lori Okeefe MD, PhD  7/7/2022

## 2022-08-02 ENCOUNTER — TELEPHONE (OUTPATIENT)
Dept: ORTHOPEDIC SURGERY | Age: 37
End: 2022-08-02

## 2022-08-02 DIAGNOSIS — M25.511 RIGHT SHOULDER PAIN, UNSPECIFIED CHRONICITY: Primary | ICD-10-CM

## 2022-08-02 NOTE — TELEPHONE ENCOUNTER
General Question     Subject: PT WOULD LIKE TO GO AHEAD AN GET THE CT SCAN ORDERED BEFORE HIS APPT ON 08/17. THE CORTISONE INJECTION HELPED SOMEWHAT BUT NOT TOTALLY.   Patient and /or Facility Request: Ike Mattson. Number: [phone]

## 2022-08-17 ENCOUNTER — OFFICE VISIT (OUTPATIENT)
Dept: ORTHOPEDIC SURGERY | Age: 37
End: 2022-08-17
Payer: COMMERCIAL

## 2022-08-17 VITALS — HEIGHT: 75 IN | WEIGHT: 309 LBS | BODY MASS INDEX: 38.42 KG/M2

## 2022-08-17 DIAGNOSIS — M25.511 RIGHT SHOULDER PAIN, UNSPECIFIED CHRONICITY: Primary | ICD-10-CM

## 2022-08-17 DIAGNOSIS — M19.111 POST-TRAUMATIC OSTEOARTHRITIS OF RIGHT SHOULDER: ICD-10-CM

## 2022-08-17 PROCEDURE — 99214 OFFICE O/P EST MOD 30 MIN: CPT | Performed by: ORTHOPAEDIC SURGERY

## 2022-08-17 NOTE — PROGRESS NOTES
12 [facility]  History and Physical  Shoulder Pain    Date:  2022    Name:  Candance Kappa  Address:  [place] [address]    :  1985      Age:   44 y.o.    SSN:  [id number]      Medical Record Number:  [id number]    Reason for Visit:    Follow-up (Right Shoulder)      HPI:   Candance Kappa is a 44 y.o. male who presents to our office today for follow up of the right shoulder pain. The patient is well known to us. He has a history of posttraumatic osteoarthritis of his right shoulder. He was given a corticosteroid injection at his last visit. He reports that the injection was helpful for a few weeks but then his symptoms gradually returned. Patient does have pain with movement. He has cracking and popping as well. Patient has tried many conservative treatment modalities including, physical therapy, massage therapy, injections, oral NSAIDs and creams without much relief. HPI 22  The patient has a history of undergoing multiple surgeries on both shoulders. He underwent revision stabilization of the right shoulder and primary stabilization of the left shoulder nearly 20 years ago. The most recent surgery was done on 2017 for extensive debridement and open subpectoral biceps tenodesis of the right shoulder. Patient reports since his surgery he has been doing quite well and has returned back to most of his activities. Patient reports he tries to stay active and play sports when he can. Patient has noticed a progressive gradual decline in his overall range of motion. She does have a deep achiness within the shoulder with certain movements. Patient reports lying on the right side along with lifting or doing push-ups can really aggravate his shoulders. Denies any dislocation episodes or any new injury since his last surgery.     His right shoulder history is outlined below  Hx of Right shoulder dislocation around age 15 or 160 on the left, external rotation with elbow at the side 20, internal rotation to the back is L3    Passive Range of Motion: similar to active. Strength: External rotation with resistance with elbow at the side 5/5, internal rotation with resistance with elbow at the side 5/5, Champagne toast testing 5/5, Jobes test 5/5    Special Tests:  No Yosef muscle deformity. Neurovascular: Sensation to light touch is intact, no motor deficits, palpable radial pulses 2+    Additional Examinations:    Examination of the contralateral extremity does not show any tenderness, deformity or injury. Range of motion is unremarkable. There is no gross instability. There are no rashes, ulcerations or lesions. Strength and tone are normal.      Radiographic:  CT right shoulder  8/17/2022  FINDINGS:  Match Point protocol. Normal appearing rotator group and deltoid muscles. Severe degenerative arthropathy at the glenohumeral joint with prominent osteophytosis of the    glenoid and humeral head. Normal alignment at the glenohumeral joint. A chronic-appearing    ossicle within the axillary recess of the glenohumeral joint is 3mm. Enthesopathy of the    footprint greater tuberosity. Status post tenodesis of the long head biceps. Moderate degenerative arthrosis at the [facility] joint. No fracture or mass lesion. Several anchors are embedded within the glenoid. CONCLUSION:   1. Severe degenerative arthrosis at the glenohumeral joint. 2. Normal muscles of the right shoulder girdle including the deltoid muscle. Assessment:  Dedrick العراقي is a 44 y.o. 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 Impression:  Encounter Diagnoses   Name Primary?     Right shoulder pain, unspecified chronicity Yes    Post-traumatic osteoarthritis of right shoulder        Office Procedures:  No orders of the defined types were placed in this encounter. Plan:   Pertinent imaging was reviewed. The etiology, natural history, and treatment options for the disorder were discussed. The roles of activity modifications, medications, cryotherapy and heat, injections, physical therapy, and surgical interventions were all described to the patient and questions were answered. We reviewed the CT with the patient. He is a good candidate for a shoulder replacement. We will plan for a stem less replacement with a ream and run procedure. We can certainly do this when the patient is ready to have this procedure done. Risks, benefits and potential complications of shoulder surgery were discussed with the patient. Risks discussed include but are not limited to bleeding, infection, anesthetic risk, injury to nerves and blood vessels, deep vein thrombosis, residual stiffness and weakness, and the need for revision surgery. The patient also understands that anesthetic risks include cardiopulmonary issues, drug reactions and even death. The patient voices an understanding of the importance of physical therapy and home exercises after surgery. All questions were answered and written informed consent for surgery was obtained today. Robert Pond will follow up in 12 weeks and/or as needed. They were in agreement with this plan and all questions were answered to the patient's satisfaction. They were encouraged to call with any questions. Greater than 30 minutes were expended on all aspects of today's visit. 8/17/2022  12:14 PM    Jerson Ewing PA-C  Orthopaedic Sports Medicine Physician Assistant    During this examination, Jerson THRASHER PA-C, functioned as a scribe for Dr. Charmaine Llamas. This dictation was performed with a verbal recognition program (DRAGON) and it was checked for errors.   It is possible that there are still dictated errors within this office note. If so, please bring any errors to my attention for an addendum. All efforts were made to ensure that this office note is accurate.  _______________  I, Dr. Charmaine Llamas, personally performed the services described in this documentation as described by Jerson Ewing PA-C in my presence, and it is both accurate and complete. Lori Linton MD, PhD  8/17/2022

## 2022-08-19 ENCOUNTER — TELEPHONE (OUTPATIENT)
Dept: ORTHOPEDIC SURGERY | Age: 37
End: 2022-08-19

## 2022-08-19 NOTE — TELEPHONE ENCOUNTER
Surgery and/or Procedure Scheduling     Contact Name: Jsoe Begum Request: RIGHT SHOULDER  Patient Contact Number: [phone]

## 2022-09-14 NOTE — TELEPHONE ENCOUNTER
Other PATIENT CALLED WANTS TO BE SURE THAT HE HAS ALL OF HIS PAPERWORK AND PREOP TASKS COMPLETED.  PLS CALL TO ADVISE [phone]

## 2022-10-07 ENCOUNTER — TELEPHONE (OUTPATIENT)
Dept: ORTHOPEDIC SURGERY | Age: 37
End: 2022-10-07

## 2022-10-10 NOTE — TELEPHONE ENCOUNTER
Auth: # [id number]    Date: 10/28/22 thru 12/26/22  Type of SX:  Outpatient  Location: [facility]  CPT: 75560   DX Code: O40.650  SX area: Rt shoulder  Insurance: [employer]

## 2022-10-12 ENCOUNTER — OFFICE VISIT (OUTPATIENT)
Dept: ORTHOPEDIC SURGERY | Age: 37
End: 2022-10-12
Payer: COMMERCIAL

## 2022-10-12 VITALS — WEIGHT: 309 LBS | HEIGHT: 75 IN | BODY MASS INDEX: 38.42 KG/M2

## 2022-10-12 DIAGNOSIS — M19.111 POST-TRAUMATIC OSTEOARTHRITIS OF RIGHT SHOULDER: Primary | ICD-10-CM

## 2022-10-12 PROCEDURE — 99214 OFFICE O/P EST MOD 30 MIN: CPT | Performed by: ORTHOPAEDIC SURGERY

## 2022-10-12 PROCEDURE — L3670 SO ACRO/CLAV CAN WEB PRE OTS: HCPCS | Performed by: ORTHOPAEDIC SURGERY

## 2022-10-12 NOTE — PROGRESS NOTES
Chief Complaint    Shoulder Pain (PRE OP RIGHT SHOULDER)      History of Present Illness:  Shruthi Wynn is a pleasant, 44 y.o., male, here today for a pre-operative exam of his right shoulder. The patient is well known to us. He has a history of posttraumatic osteoarthritis of his right shoulder. He is on the schedule for 10/28/22 for a shoulder replacement - we will plan for a stemless implant. He reports no new injuries or setbacks. Pain Assessment  Location of Pain: Shoulder  Location Modifiers: Right  Severity of Pain: 4  Quality of Pain: Dull  Duration of Pain: Persistent  Frequency of Pain: Constant  Aggravating Factors: Other (Comment), Exercise, Straightening, Stretching  Limiting Behavior: Yes  Relieving Factors: Rest, Ice  Work-Related Injury: No  Are there other pain locations you wish to document?: No      Medical History:  Patient's medications, allergies, past medical, surgical, social and family histories were reviewed and updated as appropriate. No notes on file    Review of Systems  A 14 point review of systems was completed by the patient and is available in the media section of the scanned medical record and was reviewed on 10/12/2022. The review is negative with the exception of those things mentioned in the HPI and Past Medical History    Vital Signs: There were no vitals filed for this visit. General/Appearance: Alert and oriented and in no apparent distress. Skin:  There are no skin lesions, cellulitis, or extreme edema. The patient has warm and well-perfused Bilateral upper extremities with brisk capillary refill. Right Shoulder Exam:  Inspection:  No gross deformities, no signs of infection. Palpation:  There is glenohumeral crepitus. Tenderness to palpation over the joint line. Non-tender to palpation over the bicipital groove and rotator cuff. Active Range of Motion:  Forward elevation of 140 vs 160 on the left, abduction of 90 vs 160 on the left, external rotation with elbow at the side 20, internal rotation to the back is L3     Passive Range of Motion: similar to active. Strength: External rotation with resistance with elbow at the side 5/5, internal rotation with resistance with elbow at the side 5/5, Champagne toast testing 5/5, Jobes test 5/5     Special Tests:  No Yosef muscle deformity. Neurovascular: Sensation to light touch is intact, no motor deficits, palpable radial pulses 2+      Radiology:     No new XR obtained at this time. Assessment :  Terence Grant is a 44 y.o. 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 Our plan is to proceed with stemless ream and run total shoulder arthroplasty. Impression:  Encounter Diagnosis   Name Primary? Post-traumatic osteoarthritis of right shoulder Yes       Office Procedures:  Orders Placed This Encounter   Procedures    DJO ultrasling IV Shoulder Sling     Patient was prescribed a DJO Ultrasling IV Shoulder Brace. The right shoulder will require stabilization / immobilization from this orthosis. The orthosis will assist in protecting the affected area, provide functional support and facilitate healing. The device was ordered and fit on 10/12/2022. The patient was educated and fit by a healthcare professional with expert knowledge and specialization in brace application while under the direct supervision of the treating physician. Verbal and written instructions for the use of and application of this item were provided. They were instructed to contact the office immediately should the brace result in increased pain, decreased sensation, increased swelling or worsening of the condition. Treatment Plan: We will proceed with Damaris Roe surgery as planned on 10/28/22.  We discussed in detail risks, benefits and expectations postoperatively. We also discussed a recovery timeline following this operation. We will go ahead and fit this patient with an ultrasling brace to be worn postoperatively. We will see Renee Even back in for surgery and/or as needed. All questions were answered to patient's satisfaction and He was encouraged to call with any further questions or concerns. Marvin Vazquez is in agreement with this plan. Risks, benefits and potential complications of total shoulder arthroplasty surgery were discussed with the patient. Risks discussed include but are not limited to bleeding, infection, anesthetic risk, injury to nerves and blood vessels, deep vein thrombosis, residual stiffness and weakness, and the need for revision surgery. The patient also understands that anesthetic risks include cardiopulmonary issues, drug reactions and even death. The patient voices an understanding of the importance of physical therapy and home exercises after surgery. All questions were answered and written informed consent for surgery was obtained today. 10/12/2022  12:13 PM    Sara Mehta ATC  [facility] [address] [facility]    During this examination, Dorie THRASHER, functioned as a scribe for Dr. Nikita Fleming. The history taking and physical examination were performed by Dr. Mariaelena Escudero. All counseling during the appointment was performed between the patient and Dr. Mariaelena Escudero. 10/12/22    ______________  I, Dr. Nikita Fleming, personally performed the services described in this documentation as described by Sara Mehta ATC in my presence, and it is both accurate and complete. Lori Escudero MD, PhD  10/12/2022

## 2022-10-19 ENCOUNTER — TELEPHONE (OUTPATIENT)
Dept: ORTHOPEDIC SURGERY | Age: 37
End: 2022-10-19

## 2022-10-19 NOTE — TELEPHONE ENCOUNTER
Orthopedic Nurse Navigator Summary    Patient Name:  Lorraine Messina  Anticipated Date of Surgery:  10/28/22  Attended Pre-op Education Class:  Video sent to patient- he watched it  PCP: Roby Harris DO  Date of PCP visit for H&P: 10/03/22  Is patient in a Pain Management program:  Review of Medical history reveals history of: Arthritis    Critical Lab Values  - Hemoglobin (g/dL):  Date: 10/03/22 Value 14.8  - Hematocrit(%): Date:  10/03/22 Value 43.0  - HgbA1C:  Date: 10/03/22 Value 5.1  - Albumin:  Date:  10/03/22 Value 5.0  - BUN:  Date: 10/03/22  Value 16  - Creatinine:  Date: 10/03/22  Value 1.03    MRSA swab 10/03/22- negative    Coronary Artery Disease/HTN/CHF history: No  Cardiologist: No  Cardiac clearance necessary:  No  Date of cardiac clearance appt:  Final Cardiac recommendations: On any anticoagulation: No    Diabetes History:  No  Most recent HgbA1C:  Pulmonary:  COPD/Emphysema/Use of home oxygen: No  Alcohol use: Social    BMI greater than 40 at time of scheduling: Additional medical concerns:   Additional recommendations for above concerns:  Attended Pre-Hab program:    Anticipated Discharge Disposition:  Home with OPT  Who will be with patient at home following discharge: wife   Equipment patient already has:  sling  Bedroom on first or second floor:  first  Bathroom on first or second floor:  first  Weight bearing status:  nwb rt arm, otherwise fwb  Pre-op ambulatory status: no issues  Number of entry steps:  2  Caregiver assistance:  full time    Glory Kern RN  Date:   10/19/22

## 2022-10-26 ENCOUNTER — TELEPHONE (OUTPATIENT)
Dept: ORTHOPEDIC SURGERY | Age: 37
End: 2022-10-26

## 2022-10-26 NOTE — PROGRESS NOTES
PRE-OP INSTRUCTIONS FOR SURGICAL PATIENTS          Our Pre-admission Testing Nurses tried and were unable to reach you today. Please read the attached instructions if you did not listen to your voicemail. Follow all instructions provided to you from your surgeon's office, including your ARRIVAL TIME. Arrange for someone to drive you home and be with you for the first 24 hours after discharge. NOTE: at this time ONLY 2 ADULTS may accompany you and everyone must be masked. Enter the MAIN entrance located on [address] and report to the surgical desk on the LEFT side of the lobby. Please park in the parking garage or there is free [employer] available after 7am for your use. Bring your insurance card & photo ID with you to register. Bring your medication list with you with dose and frequency listed (including over the counter medications)  Contact your ordering physician/surgeon for medication instructions as soon as possible, especially if taking blood thinners, aspirin, heart, or diabetic medication. Bariatric surgical patients need to call your surgeon if on diabetic medications (as some may need to be stopped 1-week preop)  A Pre-Surgical History and Physical MUST be completed WITHIN 30 DAYS OR LESS prior to your procedure by your Physician or an Urgent Care. DO NOT EAT ANYTHING 8 hours prior to arrival for surgery. You may have sips of WATER ONLY (up to 8 ounces) 4 hours prior to your arrival for surgery. Then nothing further 4 hours prior to arriving at hospital.   NOTE: ALL Gastric, Bariatric & Bowel surgery patients - you MUST follow your surgeon's instructions regarding eating/drinking as you will have very specific instructions to follow. If you did not receive these, call your surgeon's office immediately. No gum, candy, mints, or ice chips day of procedure. Please refrain from drinking alcohol the day before or day of your procedure.    Do not use any recreational marijuana at least 24 hours or street drugs (heroin, cocaine) at minimum 5 days prior to your procedure. Please do not smoke the day of your procedure. Dress in loose, comfortable clothing appropriate for redressing after your procedure. Do not wear jewelry (including body piercings), make-up, fingernail polish, lotion, powders, or metal hairclips. Contacts, glasses, dentures, and hearing aids will need to be removed prior to surgery. Bring your case(s) to protect them while you are in surgery. If you use a CPAP, please bring it with you on the day of your procedure. Do not shave or wax for 72 hours prior to procedure near your operative site. Leave all other valuables at home. IF there is a change in your physical condition for some reason, such as: a cold, fever, rash, cuts, sores, or any other infection, especially near your surgical site, contact your surgeon's office immediately. FOR WOMAN OF CHILDBEARING AGE ONLY- please make sure we can collect a urine sample upon arrival.     Instructions left on patient's voicemail.   Alicja Zamarripa RN.10/26/2022 .1:20 PM

## 2022-10-26 NOTE — PROGRESS NOTES
Place patient label inside box (if no patient label, complete below)  Name:  :  MR#:   Eliseo Ho / [address]  I (we), Audie Milligan (Patient Name) authorize DR. Augustina Langford (Provider / Christopher Harris) and/or such assistants as may be selected by him/her, to perform the following operation/procedure(s): RIGHT TOTAL SHOULDER REPLACEMENT WITH REAM AND RUN PROCEDURE       Note: If unable to obtain consent prior to an emergent procedure, document the emergent reason in the medical record. This procedure has been explained to my (our) satisfaction and included in the explanation was: The intended benefit, nature, and extent of the procedure to be performed; The significant risks involved and the probability of success; Alternative procedures and methods of treatment; The dangers and probable consequences of such alternatives (including no procedure or treatment); The expected consequences of the procedure on my future health; Whether other qualified individuals would be performing important surgical tasks and/or whether  would be present to advise or support the procedure. I (we) understand that there are other risks of infection and other serious complications in the pre-operative/procedural and postoperative/procedural stages of my (our) care. I (we) have asked all of the questions which I (we) thought were important in deciding whether or not to undergo treatment or diagnosis. These questions have been answered to my (our) satisfaction. I (we) understand that no assurance can be given that the procedure will be a success, and no guarantee or warranty of success has been given to me (us). It has been explained to me (us) that during the course of the operation/procedure, unforeseen conditions may be revealed that necessitate extension of the original procedure(s) or different procedure(s) than those set forth in Paragraph 1.  I (we) authorize and request that the above-named physician, his/her assistants or his/her designees, perform procedures as necessary and desirable if deemed to be in my (our) best interest.     Revised 8/2/2021                                                                          Page 1 of 2       I acknowledge that health care personnel may be observing this procedure for the purpose of medical education or other specified purposes as may be necessary as requested and/or approved by my (our) physician. I (we) consent to the disposal by the hospital Pathologist of the removed tissue, parts or organs in accordance with hospital policy. I do ____ do not ____ consent to the use of a local infiltration pain blocking agent that will be used by my provider/surgical provider to help alleviate pain during my procedure. I do ____ do not ____ consent to an emergent blood transfusion in the case of a life-threatening situation that requires blood components to be administered. This consent is valid for 24 hours from the beginning of the procedure. This patient does ____ or does not ____ currently have a DNR status/order. If DNR order is in place, obtain Addendum to the Surgical Consent for ALL Patients with a DNR Order to address german-operative status for limited intervention or DNR suspension.      I have read and fully understand the above Consent for Operation/Procedure and that all blanks were completed before I signed the consent.   _____________________________       _____________________      ____/____am/pm  Signature of Patient or legal representative      Printed Name / Relationship            Date / Time   ____________________________       _____________________      ____/____am/pm  Witness to Signature                                    Printed Name                    Date / Time    If patient is unable to sign or is a minor, complete the following)  Patient is a minor, ____ years of age, or unable to sign because:   ______________________________________________________________________________________________    If a phone consent is obtained, consent will be documented by using two health care professionals, each affirming that the consenting party has no questions and gives consent for the procedure discussed with the physician/provider.   _____________________          ____________________       _____/_____am/pm   2nd witness to phone consent        Printed name           Date / Time    Informed Consent:  I have provided the explanation described above in section 1 to the patient and/or legal representative.  I have provided the patient and/or legal representative with an opportunity to ask any questions about the proposed operation/procedure.   ___________________________          ____________________         ____/____am/pm  Provider / Proceduralist                            Printed name            Date / Time  Revised 8/2/2021                                                                      Page 2 of 2

## 2022-10-26 NOTE — TELEPHONE ENCOUNTER
General Question     Subject: PATIENT REQUESTING A CALL FROM [address] [place] [address].  Patient: Seferino Workman  Contact Number: [phone]

## 2022-10-27 ENCOUNTER — ANESTHESIA EVENT (OUTPATIENT)
Dept: OPERATING ROOM | Age: 37
End: 2022-10-27
Payer: COMMERCIAL

## 2022-10-28 ENCOUNTER — HOSPITAL ENCOUNTER (OUTPATIENT)
Age: 37
Setting detail: OUTPATIENT SURGERY
Discharge: HOME | End: 2022-10-28
Attending: ORTHOPAEDIC SURGERY | Admitting: ORTHOPAEDIC SURGERY
Payer: COMMERCIAL

## 2022-10-28 ENCOUNTER — ANESTHESIA (OUTPATIENT)
Dept: OPERATING ROOM | Age: 37
End: 2022-10-28
Payer: COMMERCIAL

## 2022-10-28 ENCOUNTER — APPOINTMENT (OUTPATIENT)
Dept: GENERAL RADIOLOGY | Age: 37
End: 2022-10-28
Attending: ORTHOPAEDIC SURGERY
Payer: COMMERCIAL

## 2022-10-28 VITALS
RESPIRATION RATE: 20 BRPM | DIASTOLIC BLOOD PRESSURE: 78 MMHG | HEIGHT: 75 IN | SYSTOLIC BLOOD PRESSURE: 130 MMHG | WEIGHT: 315 LBS | BODY MASS INDEX: 39.17 KG/M2 | HEART RATE: 81 BPM | TEMPERATURE: 97.4 F | OXYGEN SATURATION: 94 %

## 2022-10-28 DIAGNOSIS — Z96.611 S/P SHOULDER REPLACEMENT, RIGHT: Primary | ICD-10-CM

## 2022-10-28 LAB
ABO/RH: NORMAL
ANTIBODY SCREEN: NORMAL

## 2022-10-28 PROCEDURE — 97161 PT EVAL LOW COMPLEX 20 MIN: CPT

## 2022-10-28 PROCEDURE — 97535 SELF CARE MNGMENT TRAINING: CPT

## 2022-10-28 PROCEDURE — 7100000001 HC PACU RECOVERY - ADDTL 15 MIN: Performed by: ORTHOPAEDIC SURGERY

## 2022-10-28 PROCEDURE — 6370000000 HC RX 637 (ALT 250 FOR IP): Performed by: ANESTHESIOLOGY

## 2022-10-28 PROCEDURE — 3600000004 HC SURGERY LEVEL 4 BASE: Performed by: ORTHOPAEDIC SURGERY

## 2022-10-28 PROCEDURE — 6360000002 HC RX W HCPCS: Performed by: ORTHOPAEDIC SURGERY

## 2022-10-28 PROCEDURE — 2720000010 HC SURG SUPPLY STERILE: Performed by: ORTHOPAEDIC SURGERY

## 2022-10-28 PROCEDURE — 2500000003 HC RX 250 WO HCPCS: Performed by: ORTHOPAEDIC SURGERY

## 2022-10-28 PROCEDURE — 2500000003 HC RX 250 WO HCPCS: Performed by: ANESTHESIOLOGY

## 2022-10-28 PROCEDURE — 64415 NJX AA&/STRD BRCH PLXS IMG: CPT | Performed by: ANESTHESIOLOGY

## 2022-10-28 PROCEDURE — 7100000011 HC PHASE II RECOVERY - ADDTL 15 MIN: Performed by: ORTHOPAEDIC SURGERY

## 2022-10-28 PROCEDURE — 86901 BLOOD TYPING SEROLOGIC RH(D): CPT

## 2022-10-28 PROCEDURE — 2709999900 HC NON-CHARGEABLE SUPPLY: Performed by: ORTHOPAEDIC SURGERY

## 2022-10-28 PROCEDURE — C1713 ANCHOR/SCREW BN/BN,TIS/BN: HCPCS | Performed by: ORTHOPAEDIC SURGERY

## 2022-10-28 PROCEDURE — 2580000003 HC RX 258: Performed by: FAMILY MEDICINE

## 2022-10-28 PROCEDURE — A4217 STERILE WATER/SALINE, 500 ML: HCPCS | Performed by: ORTHOPAEDIC SURGERY

## 2022-10-28 PROCEDURE — 3700000001 HC ADD 15 MINUTES (ANESTHESIA): Performed by: ORTHOPAEDIC SURGERY

## 2022-10-28 PROCEDURE — C1776 JOINT DEVICE (IMPLANTABLE): HCPCS | Performed by: ORTHOPAEDIC SURGERY

## 2022-10-28 PROCEDURE — 7100000010 HC PHASE II RECOVERY - FIRST 15 MIN: Performed by: ORTHOPAEDIC SURGERY

## 2022-10-28 PROCEDURE — 97165 OT EVAL LOW COMPLEX 30 MIN: CPT

## 2022-10-28 PROCEDURE — 7100000000 HC PACU RECOVERY - FIRST 15 MIN: Performed by: ORTHOPAEDIC SURGERY

## 2022-10-28 PROCEDURE — C9290 INJ, BUPIVACAINE LIPOSOME: HCPCS | Performed by: ANESTHESIOLOGY

## 2022-10-28 PROCEDURE — 3700000000 HC ANESTHESIA ATTENDED CARE: Performed by: ORTHOPAEDIC SURGERY

## 2022-10-28 PROCEDURE — 2580000003 HC RX 258: Performed by: ORTHOPAEDIC SURGERY

## 2022-10-28 PROCEDURE — 6360000002 HC RX W HCPCS: Performed by: ANESTHESIOLOGY

## 2022-10-28 PROCEDURE — 97116 GAIT TRAINING THERAPY: CPT

## 2022-10-28 PROCEDURE — 86900 BLOOD TYPING SEROLOGIC ABO: CPT

## 2022-10-28 PROCEDURE — 6360000002 HC RX W HCPCS

## 2022-10-28 PROCEDURE — 6370000000 HC RX 637 (ALT 250 FOR IP): Performed by: ORTHOPAEDIC SURGERY

## 2022-10-28 PROCEDURE — 73020 X-RAY EXAM OF SHOULDER: CPT

## 2022-10-28 PROCEDURE — 86850 RBC ANTIBODY SCREEN: CPT

## 2022-10-28 PROCEDURE — 3600000014 HC SURGERY LEVEL 4 ADDTL 15MIN: Performed by: ORTHOPAEDIC SURGERY

## 2022-10-28 PROCEDURE — 2500000003 HC RX 250 WO HCPCS

## 2022-10-28 DEVICE — IMPLANTABLE DEVICE: Type: IMPLANTABLE DEVICE | Site: SHOULDER | Status: FUNCTIONAL

## 2022-10-28 DEVICE — IMPL CAPPED SHOULDER S2 TOTAL ADV ANATOMIC DJO: Type: IMPLANTABLE DEVICE | Site: SHOULDER | Status: FUNCTIONAL

## 2022-10-28 RX ORDER — PREGABALIN 150 MG/1
150 CAPSULE ORAL ONCE
Status: COMPLETED | OUTPATIENT
Start: 2022-10-28 | End: 2022-10-28

## 2022-10-28 RX ORDER — MIDAZOLAM HYDROCHLORIDE 1 MG/ML
2 INJECTION INTRAMUSCULAR; INTRAVENOUS ONCE
Status: COMPLETED | OUTPATIENT
Start: 2022-10-28 | End: 2022-10-28

## 2022-10-28 RX ORDER — DEXAMETHASONE SODIUM PHOSPHATE 4 MG/ML
INJECTION, SOLUTION INTRA-ARTICULAR; INTRALESIONAL; INTRAMUSCULAR; INTRAVENOUS; SOFT TISSUE PRN
Status: DISCONTINUED | OUTPATIENT
Start: 2022-10-28 | End: 2022-10-28 | Stop reason: SDUPTHER

## 2022-10-28 RX ORDER — ONDANSETRON 2 MG/ML
INJECTION INTRAMUSCULAR; INTRAVENOUS PRN
Status: DISCONTINUED | OUTPATIENT
Start: 2022-10-28 | End: 2022-10-28 | Stop reason: SDUPTHER

## 2022-10-28 RX ORDER — FENTANYL CITRATE 50 UG/ML
INJECTION, SOLUTION INTRAMUSCULAR; INTRAVENOUS PRN
Status: DISCONTINUED | OUTPATIENT
Start: 2022-10-28 | End: 2022-10-28 | Stop reason: SDUPTHER

## 2022-10-28 RX ORDER — ROCURONIUM BROMIDE 10 MG/ML
INJECTION, SOLUTION INTRAVENOUS PRN
Status: DISCONTINUED | OUTPATIENT
Start: 2022-10-28 | End: 2022-10-28 | Stop reason: SDUPTHER

## 2022-10-28 RX ORDER — OXYCODONE HYDROCHLORIDE 5 MG/1
10 TABLET ORAL PRN
Status: COMPLETED | OUTPATIENT
Start: 2022-10-28 | End: 2022-10-28

## 2022-10-28 RX ORDER — DIPHENHYDRAMINE HYDROCHLORIDE 50 MG/ML
12.5 INJECTION INTRAMUSCULAR; INTRAVENOUS
Status: DISCONTINUED | OUTPATIENT
Start: 2022-10-28 | End: 2022-10-29 | Stop reason: HOSPADM

## 2022-10-28 RX ORDER — SODIUM CHLORIDE, SODIUM LACTATE, POTASSIUM CHLORIDE, CALCIUM CHLORIDE 600; 310; 30; 20 MG/100ML; MG/100ML; MG/100ML; MG/100ML
INJECTION, SOLUTION INTRAVENOUS CONTINUOUS
Status: DISCONTINUED | OUTPATIENT
Start: 2022-10-28 | End: 2022-10-28 | Stop reason: HOSPADM

## 2022-10-28 RX ORDER — MEPERIDINE HYDROCHLORIDE 25 MG/ML
12.5 INJECTION INTRAMUSCULAR; INTRAVENOUS; SUBCUTANEOUS EVERY 5 MIN PRN
Status: DISCONTINUED | OUTPATIENT
Start: 2022-10-28 | End: 2022-10-31 | Stop reason: HOSPADM

## 2022-10-28 RX ORDER — SODIUM CHLORIDE 9 MG/ML
25 INJECTION, SOLUTION INTRAVENOUS PRN
Status: DISCONTINUED | OUTPATIENT
Start: 2022-10-28 | End: 2022-10-31 | Stop reason: HOSPADM

## 2022-10-28 RX ORDER — METOCLOPRAMIDE HYDROCHLORIDE 5 MG/ML
10 INJECTION INTRAMUSCULAR; INTRAVENOUS
Status: DISCONTINUED | OUTPATIENT
Start: 2022-10-28 | End: 2022-10-29 | Stop reason: HOSPADM

## 2022-10-28 RX ORDER — PROPOFOL 10 MG/ML
INJECTION, EMULSION INTRAVENOUS PRN
Status: DISCONTINUED | OUTPATIENT
Start: 2022-10-28 | End: 2022-10-28 | Stop reason: SDUPTHER

## 2022-10-28 RX ORDER — HYDRALAZINE HYDROCHLORIDE 20 MG/ML
10 INJECTION INTRAMUSCULAR; INTRAVENOUS
Status: DISCONTINUED | OUTPATIENT
Start: 2022-10-28 | End: 2022-10-31 | Stop reason: HOSPADM

## 2022-10-28 RX ORDER — SODIUM CHLORIDE 0.9 % (FLUSH) 0.9 %
5-40 SYRINGE (ML) INJECTION PRN
Status: DISCONTINUED | OUTPATIENT
Start: 2022-10-28 | End: 2022-10-31 | Stop reason: HOSPADM

## 2022-10-28 RX ORDER — ASPIRIN 325 MG
325 TABLET, DELAYED RELEASE (ENTERIC COATED) ORAL 2 TIMES DAILY
Qty: 30 TABLET | Refills: 0 | Status: SHIPPED | OUTPATIENT
Start: 2022-10-28 | End: 2022-11-11

## 2022-10-28 RX ORDER — LIDOCAINE HYDROCHLORIDE 20 MG/ML
INJECTION, SOLUTION INTRAVENOUS PRN
Status: DISCONTINUED | OUTPATIENT
Start: 2022-10-28 | End: 2022-10-28 | Stop reason: SDUPTHER

## 2022-10-28 RX ORDER — SENNA PLUS 8.6 MG/1
1 TABLET ORAL 2 TIMES DAILY
Qty: 60 TABLET | Refills: 11 | Status: SHIPPED | OUTPATIENT
Start: 2022-10-28 | End: 2023-10-28

## 2022-10-28 RX ORDER — SUCCINYLCHOLINE/SOD CL,ISO/PF 200MG/10ML
SYRINGE (ML) INTRAVENOUS PRN
Status: DISCONTINUED | OUTPATIENT
Start: 2022-10-28 | End: 2022-10-28 | Stop reason: SDUPTHER

## 2022-10-28 RX ORDER — OXYCODONE HYDROCHLORIDE 5 MG/1
5 TABLET ORAL PRN
Status: COMPLETED | OUTPATIENT
Start: 2022-10-28 | End: 2022-10-28

## 2022-10-28 RX ORDER — OXYCODONE HYDROCHLORIDE AND ACETAMINOPHEN 5; 325 MG/1; MG/1
1 TABLET ORAL EVERY 6 HOURS PRN
Qty: 28 TABLET | Refills: 0 | Status: SHIPPED | OUTPATIENT
Start: 2022-10-28 | End: 2022-11-04

## 2022-10-28 RX ORDER — LABETALOL HYDROCHLORIDE 5 MG/ML
10 INJECTION, SOLUTION INTRAVENOUS
Status: DISCONTINUED | OUTPATIENT
Start: 2022-10-28 | End: 2022-10-31 | Stop reason: HOSPADM

## 2022-10-28 RX ORDER — SODIUM CHLORIDE 9 MG/ML
INJECTION, SOLUTION INTRAVENOUS PRN
Status: DISCONTINUED | OUTPATIENT
Start: 2022-10-28 | End: 2022-10-28 | Stop reason: HOSPADM

## 2022-10-28 RX ORDER — BUPIVACAINE HYDROCHLORIDE 5 MG/ML
30 INJECTION, SOLUTION EPIDURAL; INTRACAUDAL ONCE
Status: DISCONTINUED | OUTPATIENT
Start: 2022-10-28 | End: 2022-10-31 | Stop reason: HOSPADM

## 2022-10-28 RX ORDER — SODIUM CHLORIDE 0.9 % (FLUSH) 0.9 %
5-40 SYRINGE (ML) INJECTION PRN
Status: DISCONTINUED | OUTPATIENT
Start: 2022-10-28 | End: 2022-10-28 | Stop reason: HOSPADM

## 2022-10-28 RX ORDER — SODIUM CHLORIDE 0.9 % (FLUSH) 0.9 %
5-40 SYRINGE (ML) INJECTION EVERY 12 HOURS SCHEDULED
Status: DISCONTINUED | OUTPATIENT
Start: 2022-10-28 | End: 2022-10-31 | Stop reason: HOSPADM

## 2022-10-28 RX ORDER — ONDANSETRON 4 MG/1
4 TABLET, FILM COATED ORAL DAILY PRN
Qty: 30 TABLET | Refills: 0 | Status: SHIPPED | OUTPATIENT
Start: 2022-10-28

## 2022-10-28 RX ORDER — DOXYCYCLINE HYCLATE 100 MG
100 TABLET ORAL 2 TIMES DAILY
Qty: 10 TABLET | Refills: 0 | Status: SHIPPED | OUTPATIENT
Start: 2022-10-28 | End: 2022-11-02

## 2022-10-28 RX ORDER — CELECOXIB 200 MG/1
400 CAPSULE ORAL ONCE
Status: COMPLETED | OUTPATIENT
Start: 2022-10-28 | End: 2022-10-28

## 2022-10-28 RX ORDER — ACETAMINOPHEN 500 MG
1000 TABLET ORAL ONCE
Status: COMPLETED | OUTPATIENT
Start: 2022-10-28 | End: 2022-10-28

## 2022-10-28 RX ORDER — BUPIVACAINE HYDROCHLORIDE 5 MG/ML
INJECTION, SOLUTION EPIDURAL; INTRACAUDAL
Status: COMPLETED | OUTPATIENT
Start: 2022-10-28 | End: 2022-10-28

## 2022-10-28 RX ORDER — GABAPENTIN 300 MG/1
300 CAPSULE ORAL ONCE
Status: COMPLETED | OUTPATIENT
Start: 2022-10-28 | End: 2022-10-28

## 2022-10-28 RX ORDER — SODIUM CHLORIDE 0.9 % (FLUSH) 0.9 %
5-40 SYRINGE (ML) INJECTION EVERY 12 HOURS SCHEDULED
Status: DISCONTINUED | OUTPATIENT
Start: 2022-10-28 | End: 2022-10-28 | Stop reason: HOSPADM

## 2022-10-28 RX ORDER — FENTANYL CITRATE 50 UG/ML
100 INJECTION, SOLUTION INTRAMUSCULAR; INTRAVENOUS ONCE
Status: COMPLETED | OUTPATIENT
Start: 2022-10-28 | End: 2022-10-28

## 2022-10-28 RX ADMIN — SODIUM CHLORIDE, POTASSIUM CHLORIDE, SODIUM LACTATE AND CALCIUM CHLORIDE: 600; 310; 30; 20 INJECTION, SOLUTION INTRAVENOUS at 06:47

## 2022-10-28 RX ADMIN — VANCOMYCIN HYDROCHLORIDE 2000 MG: 10 INJECTION, POWDER, LYOPHILIZED, FOR SOLUTION INTRAVENOUS at 07:02

## 2022-10-28 RX ADMIN — PROPOFOL 300 MG: 10 INJECTION, EMULSION INTRAVENOUS at 07:38

## 2022-10-28 RX ADMIN — ACETAMINOPHEN 1000 MG: 500 TABLET ORAL at 06:19

## 2022-10-28 RX ADMIN — FENTANYL CITRATE 50 MCG: 50 INJECTION, SOLUTION INTRAMUSCULAR; INTRAVENOUS at 10:34

## 2022-10-28 RX ADMIN — ONDANSETRON 4 MG: 2 INJECTION INTRAMUSCULAR; INTRAVENOUS at 07:45

## 2022-10-28 RX ADMIN — Medication 160 MG: at 07:38

## 2022-10-28 RX ADMIN — LIDOCAINE HYDROCHLORIDE 100 MG: 20 INJECTION, SOLUTION INTRAVENOUS at 07:38

## 2022-10-28 RX ADMIN — BUPIVACAINE HYDROCHLORIDE 20 ML: 5 INJECTION, SOLUTION EPIDURAL; INTRACAUDAL; PERINEURAL at 07:25

## 2022-10-28 RX ADMIN — ROCURONIUM BROMIDE 50 MG: 10 INJECTION INTRAVENOUS at 08:51

## 2022-10-28 RX ADMIN — MIDAZOLAM HYDROCHLORIDE 2 MG: 2 INJECTION, SOLUTION INTRAMUSCULAR; INTRAVENOUS at 07:21

## 2022-10-28 RX ADMIN — PREGABALIN 150 MG: 150 CAPSULE ORAL at 06:19

## 2022-10-28 RX ADMIN — BUPIVACAINE 10 ML: 13.3 INJECTION, SUSPENSION, LIPOSOMAL INFILTRATION at 07:25

## 2022-10-28 RX ADMIN — ROCURONIUM BROMIDE 5 MG: 10 INJECTION INTRAVENOUS at 07:38

## 2022-10-28 RX ADMIN — DEXAMETHASONE SODIUM PHOSPHATE 4 MG: 4 INJECTION, SOLUTION INTRAMUSCULAR; INTRAVENOUS at 07:45

## 2022-10-28 RX ADMIN — OXYCODONE 10 MG: 5 TABLET ORAL at 13:53

## 2022-10-28 RX ADMIN — GABAPENTIN 300 MG: 300 CAPSULE ORAL at 06:19

## 2022-10-28 RX ADMIN — ROCURONIUM BROMIDE 45 MG: 10 INJECTION INTRAVENOUS at 08:13

## 2022-10-28 RX ADMIN — CEFTRIAXONE 2000 MG: 2 INJECTION, POWDER, FOR SOLUTION INTRAMUSCULAR; INTRAVENOUS at 07:42

## 2022-10-28 RX ADMIN — FENTANYL CITRATE 100 MCG: 50 INJECTION INTRAMUSCULAR; INTRAVENOUS at 07:21

## 2022-10-28 RX ADMIN — FENTANYL CITRATE 50 MCG: 50 INJECTION, SOLUTION INTRAMUSCULAR; INTRAVENOUS at 07:38

## 2022-10-28 RX ADMIN — CELECOXIB 400 MG: 200 CAPSULE ORAL at 06:19

## 2022-10-28 ASSESSMENT — PAIN DESCRIPTION - LOCATION
LOCATION: SHOULDER
LOCATION: SHOULDER

## 2022-10-28 ASSESSMENT — PAIN DESCRIPTION - DESCRIPTORS
DESCRIPTORS: ACHING
DESCRIPTORS: ACHING

## 2022-10-28 ASSESSMENT — PAIN SCALES - GENERAL
PAINLEVEL_OUTOF10: 2
PAINLEVEL_OUTOF10: 0
PAINLEVEL_OUTOF10: 2

## 2022-10-28 ASSESSMENT — PAIN - FUNCTIONAL ASSESSMENT
PAIN_FUNCTIONAL_ASSESSMENT: PREVENTS OR INTERFERES SOME ACTIVE ACTIVITIES AND ADLS
PAIN_FUNCTIONAL_ASSESSMENT: 0-10

## 2022-10-28 ASSESSMENT — PAIN DESCRIPTION - ORIENTATION
ORIENTATION: RIGHT
ORIENTATION: RIGHT

## 2022-10-28 ASSESSMENT — PAIN DESCRIPTION - FREQUENCY: FREQUENCY: CONTINUOUS

## 2022-10-28 ASSESSMENT — PAIN DESCRIPTION - PAIN TYPE: TYPE: SURGICAL PAIN

## 2022-10-28 ASSESSMENT — PAIN DESCRIPTION - ONSET: ONSET: ON-GOING

## 2022-10-28 NOTE — PROGRESS NOTES
Procedure: peripheral block, right shoulder  MD: Dr. Chiara Ornelas performed. Pt monitored closely on heart monitor, 2L NC, continuous pulse oximetry, EtCO2, and frequent BPs. Pt remained alert and oriented x4. pt tolerated procedure well. Pt is alert and oriented X4. Wife took clothing phone and wallet. Sling and ice pad sent to OR. ICE machine taken to PACU.

## 2022-10-28 NOTE — ANESTHESIA POSTPROCEDURE EVALUATION
Department of Anesthesiology  Postprocedure Note    Patient: Elissa Martin  MRN: [id number]  YOB: 1985  Date of evaluation: 10/28/2022      Procedure Summary     Date: 10/28/22 Room / Location: [facility] [address]  / [address]    Anesthesia Start: 0730 Anesthesia Stop: 1054    Procedure: RIGHT SHOULDER ARTHROTOMY, OPEN LYSIS OF ADHESIONS, REMOVAL OF LOOSE BODIES, REMOVAL OF RETAINED DEEP SUTURE ANCHOR, PROSTHETIC HEMIARTHROPLASTY WITH GLENOID REAMING (REAM AND RUN PROCEDURE) WITH PATIENT SPECIFIC INSTRUMENTATION (Right) Diagnosis:       Post-traumatic osteoarthritis, right shoulder      (Post-traumatic osteoarthritis, right shoulder [M19.111])    Surgeons: Marisel Reyes MD Responsible Provider: Any Guzman MD    Anesthesia Type: general ASA Status: 3          Anesthesia Type: No value filed.     Misa Phase I: Misa Score: 6    Misa Phase II:        Anesthesia Post Evaluation    Patient location during evaluation: PACU  Patient participation: complete - patient participated  Level of consciousness: awake and alert  Pain score: 0  Airway patency: patent  Nausea & Vomiting: no nausea and no vomiting  Complications: no  Cardiovascular status: hemodynamically stable  Respiratory status: acceptable  Hydration status: euvolemic

## 2022-10-28 NOTE — PROGRESS NOTES
Occupational Therapy  Facility/Department: [address] [place] [address]  Occupational Therapy Initial Assessment/ Treatment    Name: Terence Grant  : 1985  MRN: [id number]  Date of Service: 10/28/2022    Discharge Recommendations:     OT Equipment Recommendations  Equipment Needed: No       Patient Diagnosis(es): The encounter diagnosis was S/P shoulder replacement, right. Past Medical History:  has a past medical history of Hypertension and Overweight. Past Surgical History:  has a past surgical history that includes shoulder surgery (Right, 2017); Tonsillectomy and adenoidectomy; [place] tooth extraction; and other surgical history (Right, 2017). Terence Grant scored a 21/24 on the [facility] ADL Inpatient form. At this time, no further OT is recommended upon discharge. Recommend patient returns to prior setting with prior services. Assessment   Performance deficits / Impairments: Decreased endurance;Decreased ADL status; Decreased high-level IADLs  Assessment: Prior to admission pt was living at home with his wife, was independent with ADLs and IADLs. Pt now presents s/p R shoulder replacement, now slightly below his baseline. Pt demonstrating min A for UB dressing to don/ doff sling and shirt, however spv for LB dressing and mobilty. Pt plans to dc home with wife and family to assist, no further acute OT needs are identified. Will sign off on acute OT services. Prognosis: Good  Decision Making: Medium Complexity  REQUIRES OT FOLLOW-UP: No  Activity Tolerance  Activity Tolerance: Patient Tolerated treatment well        Plan   Occupational Therapy Plan  Times Per Week: eval and dc     Restrictions  Position Activity Restriction  Other position/activity restrictions: NWB, activity as tolerated, sling at all times    Subjective   General  Chart Reviewed:  Yes  Additional Pertinent Hx: Pt admitted for RIGHT SHOULDER ARTHROTOMY, OPEN LYSIS OF ADHESIONS, REMOVAL OF LOOSE BODIES, REMOVAL OF RETAINED DEEP SUTURE ANCHOR, PROSTHETIC HEMIARTHROPLASTY WITH GLENOID REAMING (REAM AND RUN PROCEDURE) WITH PATIENT SPECIFIC INSTRUMENTATION on 10/28. PMHx includes: HTN  Family / Caregiver Present: No  Referring Practitioner: DO Echo  Subjective  Subjective: Pt semi supine in bed upon arrival, agreeable to OT eval and treat. Pt eager to return home. Social/Functional History  Social/Functional History  Lives With: Spouse  Type of Home: House  Home Layout: Two level, Able to Live on Main level with bedroom/bathroom  Home Access: Stairs to enter without rails  Entrance Stairs - Number of Steps: 2  Bathroom Toilet: Handicap height (sink next to toilet for leverage if needed)  Bathroom Equipment: Hand-held shower, Grab bars in shower, Tub transfer bench  Home Equipment:  (no AE prior)  Has the patient had two or more falls in the past year or any fall with injury in the past year?: No  ADL Assistance: [address] [place] [address] Avenue: Independent (shares duties with wife, children)  Ambulation Assistance: Independent  Transfer Assistance: Independent  Active : Yes  Type of Occupation: works for the [employer] of [address] [place] [address]- plans to be off work for Nov.  Additional Comments: will have 24 hr initially       Objective                Safety Devices  Type of Devices: Nurse notified     Toilet Transfers  Toilet - Technique: Ambulating  Equipment Used: Standard toilet  Toilet Transfer: Supervision     ADL  Feeding: Beverage management;Setup  UE Dressing: Minimal assistance  UE Dressing Skilled Clinical Factors: pt educated on donning/ doffing sling. Ed on donning R UE first, pt verb understanding. Assist to lift R UE to thread sleeve into shirt.   LE Dressing: Supervision  LE Dressing Skilled Clinical Factors: pulling pants up over hips  Toileting: Supervision  Toileting Skilled Clinical Factors: simulated as pt did not have urge to void     Patient educated on shoulder immobilizer; including purpose, donning/doffing, fit/positioning, and wear schedule as ordered by surgical team. Patient verbalized and performed return demonstration, confirming understanding. Bed mobility  Supine to Sit: Supervision  Sit to Supine: Supervision  Transfers  Sit to stand: Supervision  Stand to sit: Supervision  Transfer Comments: Pt completed mobiltiy from room to bathroom without AE with spv, no LOB  Vision  Vision: Within Functional Limits  Hearing  Hearing: Within functional limits  Cognition  Overall Cognitive Status: WFL  Orientation  Overall Orientation Status: Within Functional Limits                  Education Given To: Patient  Education Provided: Role of Therapy  Education Method: Verbal  Barriers to Learning: None  Education Outcome: Verbalized understanding          Treatment included functional transfer training, ADLs, and patient education. Goals  Patient Goals   Patient goals : go home       Therapy Time   Individual Concurrent Group Co-treatment   Time In 1772         Time Out 1256         Minutes 23         Timed Code Treatment Minutes: 8 Minutes (+15 min eval)     Pia WEBB  [address] [facility], OTR/L [id number]

## 2022-10-28 NOTE — ANESTHESIA PRE PROCEDURE
Department of Anesthesiology  Preprocedure Note       Name:  Dorcas Garcia   Age:  44 y.o.  :  1985                                          MRN:  [id number]         Date:  10/28/2022      Surgeon: Juan Su):  Arturo Smith MD    Procedure: Procedure(s):  RIGHT TOTAL SHOULDER REPLACEMENT WITH REAM AND RUN PROCEDURE    Medications prior to admission:   Prior to Admission medications    Medication Sig Start Date End Date Taking? Authorizing Provider   acetaminophen (TYLENOL) 500 MG tablet Take 500 mg by mouth every 6 hours as needed for Pain    Historical Provider, MD       Current medications:    Current Facility-Administered Medications   Medication Dose Route Frequency Provider Last Rate Last Admin    cefTRIAXone (ROCEPHIN) 2,000 mg in dextrose 5 % 50 mL IVPB mini-bag  2,000 mg IntraVENous Once Arturo Smith MD        vancomycin (VANCOCIN) 2,000 mg in dextrose 5 % 500 mL IVPB  15 mg/kg IntraVENous Once Arturo Smith MD        lactated ringers infusion   IntraVENous Continuous Ame Cagle MD        sodium chloride flush 0.9 % injection 5-40 mL  5-40 mL IntraVENous 2 times per day Ame Cagle MD        sodium chloride flush 0.9 % injection 5-40 mL  5-40 mL IntraVENous PRN Ame Cagle MD        0.9 % sodium chloride infusion   IntraVENous PRN Ame Cagle MD        midazolam (VERSED) injection 2 mg  2 mg IntraVENous Once Sadaf Menard MD        fentaNYL (SUBLIMAZE) injection 100 mcg  100 mcg IntraVENous Once Sadaf Menard MD        bupivacaine (PF) (MARCAINE) 0.5 % injection 150 mg  30 mL Infiltration Once Sadaf Menard MD           Allergies:  No Known Allergies    Problem List:  There is no problem list on file for this patient.       Past Medical History:        Diagnosis Date    Hypertension 2017    preop undiagnosed    Overweight        Past Surgical History:        Procedure Laterality Date    OTHER SURGICAL HISTORY Right 2017    RIGHT SHOULDER EUA, DIAGNOSTIC ARTHROSCOPY, ARTHROSCOPIC    SHOULDER SURGERY Right 12/11/2017    shoulder surgery x3    TONSILLECTOMY AND ADENOIDECTOMY      WISDOM TOOTH EXTRACTION         Social History:    Social History     Tobacco Use    Smoking status: Never    Smokeless tobacco: Never   Substance Use Topics    Alcohol use: Yes     Comment: occasionally                                Counseling given: Not Answered      Vital Signs (Current):   Vitals:    10/27/22 2100 10/28/22 0612 10/28/22 0615 10/28/22 0622   BP:  (!) 146/114 (!) 174/104 (!) 164/92   Pulse:  72     Resp:  18     Temp:  98.2 °F (36.8 °C)     TempSrc:  Temporal     SpO2:  96%     Weight: (!) 309 lb 1.4 oz (140.2 kg) (!) 342 lb 12.8 oz (155.5 kg)     Height:  6' 3\" (1.905 m)                                                BP Readings from Last 3 Encounters:   10/28/22 (!) 164/92   03/21/18 119/78   02/07/18 120/80       NPO Status: Time of last liquid consumption: 1930                        Time of last solid consumption: 1930                        Date of last liquid consumption: 10/27/22                        Date of last solid food consumption: 10/27/22    BMI:   Wt Readings from Last 3 Encounters:   10/28/22 (!) 342 lb 12.8 oz (155.5 kg)   10/12/22 (!) 309 lb (140.2 kg)   08/17/22 (!) 309 lb (140.2 kg)     Body mass index is 42.85 kg/m². CBC: No results found for: WBC, RBC, HGB, HCT, MCV, RDW, PLT    CMP: No results found for: NA, K, CL, CO2, BUN, CREATININE, GFRAA, AGRATIO, LABGLOM, GLUCOSE, GLU, PROT, CALCIUM, BILITOT, ALKPHOS, AST, ALT    POC Tests: No results for input(s): POCGLU, POCNA, POCK, POCCL, POCBUN, POCHEMO, POCHCT in the last 72 hours.     Coags: No results found for: PROTIME, INR, APTT    HCG (If Applicable): No results found for: PREGTESTUR, PREGSERUM, HCG, HCGQUANT     ABGs: No results found for: PHART, PO2ART, FSG2SBQ, MIU8JKF, BEART, U8ILEYCK     Type & Screen (If Applicable):  No results found for: LABABO, LABRH    Drug/Infectious Status (If Applicable):  No results found for: HIV, HEPCAB    COVID-19 Screening (If Applicable): No results found for: COVID19        Anesthesia Evaluation  Patient summary reviewed and Nursing notes reviewed no history of anesthetic complications:   Airway: Mallampati: II  TM distance: >3 FB   Neck ROM: full  Mouth opening: > = 3 FB   Dental:          Pulmonary:                              Cardiovascular:    (+) hypertension:,                   Neuro/Psych:   Negative Neuro/Psych ROS              GI/Hepatic/Renal:             Endo/Other: Negative Endo/Other ROS                    Abdominal:             Vascular: negative vascular ROS. Other Findings:           Anesthesia Plan      general     ASA 1    (50-year-old male presents for right total shoulder arthroplasty. Plan general anesthesia with ASA standard monitors; interscalene brachial plexus block for postoperative analgesia upon request of the attending surgeon. Questions answered. Patient agreeable with anesthetic plan.   )  Induction: intravenous. Anesthetic plan and risks discussed with patient. Plan discussed with CRNA.     Attending anesthesiologist reviewed and agrees with Kamlesh Schmitt MD   10/28/2022

## 2022-10-28 NOTE — DISCHARGE INSTRUCTIONS
PATIENT INSTRUCTIONS FOLLOWING OUT-PATIENT   SHOULDER SURGERY    1. You have probably had a Scalene Block to your arm. Because this numbs the arm this is great for anesthesia since we didnt have to give you as much anesthetic agent during the case--hopefully allowing you to feel more normal sooner. The block should last around 12 hours or so. So, if you had surgery at [address] [place] [address] it may wear off as soon as 8PM. When the block wears off, it wears off suddenly and you could go from no pain to severe pain quickly. We recommend that you take pain medication (typically 1-2 Percocet) by 8PM even if you are feeling only mild pain. It is easier to maintain good pain relief than to try to catch up.     2. You may have to take the pain medicine every 4-8 hours for the first day or two. After this, you can wean off the medicine and just take it as needed. 3. All pain medications can make you constipated. Drink plenty of fluids and eat lots of fiber to combat this. If you go for more than 3 days without a bowel movement, try a laxative. We recommend Miralax, an over the counter laxative, and/or colace, an over the counter stool softener. 4. Getting comfortable for sleeping is difficult after this surgery. It gets easier after several days. Many patients sleep better in a reclining lounger like a Lay-Z-Boy or in bed with several pillows to help prop them up. 5. Ice packs or a commercial ice cuff/machine are very helpful to reduce pain and inflammation after the surgery. Since the bandage is so thick you can leave the ice bags on top until the shoulder gets cold. Dont ice bare skin however or you could get frostbite. 6. Anesthesia and the pain medication can cause nausea. If this is severe or leads to throwing up call our office at [phone] so we can call an anti-nausea medicine into your pharmacy. Have the pharmacy phone number handy--its on the prescription bottles.     7. You probably have your first post-op appointment already scheduled, along with your first physical therapy appointment. If not, call us at [phone] to schedule it. We like to see you 5-8 days after the operation. 8. Getting dressed. Huge shirts work well to cover the shoulder. We will show you how to put a shirt on at the first office visit so that you can then start wearing the shirt under the sling. 9. Have someone drive you to the first appointment. You will be in a big sling and still probably taking pain medication. That wouldnt look good if you got in an accident. 10. If we gave you arthroscopic snap shots of your repaired shoulder or elbow, please bring them with you to the first office visit so that we can review them together. 11. Keep the dressing dry. The bulky dressing can be removed after 3 days after which it is okay to shower. Until then, however, sponge baths should be done for hygeine. Do not remove the steri-strips. We typically have patients leave them on until they slowly curl up and fall off. The longer the steri-strips are on the prettier the wounds look. Leave the see-through mesh dressing beneath your bandages in place when you eventually remove your outer bandages, we will change it at your first post-operative appointment. 12. Any questions or problems contact us anytime at [phone]. GENERAL OR TWILIGHT ANESTHESIA    1. For a minimum of 24 hours:   No driving or operating heavy machinery   No alcohol, sleeping pills, or tranquilizers   No signing important legal documents  2. Limit your general activity and rest for 24 hours. Resume your normal activities as you begin to feel better. 3.  Eat lightly for your first meal.  Advance to regular diet as tolerated. 4.  Cough and deep breathe every hour while awake for the next 24 hours  5.   If you have any problems and are unable to contact your doctor, go to the nearest emergency department. NERVE BLOCK INSTRUCTIONS      1. Your affected limb will be numb until the block starts to wear off  2. Do not use the affected limb until the block wears off  3. Start taking pain medication before the block wears off--expect pain to increase over the next 12 hours. [address]    There are potential side effects of anesthesia or sedation you may experience for the first 24 hours. These side effects include:    Confusion or Memory loss, Dizziness, or Delayed Reaction Times   [x]A responsible person should be with you for the next 24 hours. Do not operate any vehicles (automobiles, bicycles, motorcycles) or power tools or machinery for 24 hours. Do not sign any legal documents or make any legal decisions for 24 hours. Do not drink alcohol for 24 hours or while taking narcotic pain medication. Nausea    [x]Start with light diet and progress to your normal diet as you feel like eating. However, if you experience nausea or repeated episodes of vomiting which persist beyond 12-24 hours, notify your physician. Once nausea has passed, remember to keep drinking fluids. Difficulty Passing Urine  [x]Drink extra amounts of fluid today. Notify your physician if you have not urinated within 8 hours after your procedure or you feel uncomfortable. Irritated Throat from a Breathing Tube  [x]Drink extra amounts of fluid today. Lozenges may help. Muscle Aches  [x]You may experience some generalized body aches as your muscles recover from medications used to relax them during surgery. These will gradually subside. MEDICATION INSTRUCTIONS:  []Prescription(S) x     sent with you. Use as directed. When taking pain medications, you may experience the side effect of dizziness or drowsiness. Do not drink alcohol or drive when taking these medications.   []Prescription(S) x          Called to Pharmacy Name and patient/significant other:  [x]Procedure/physician specific instructions  []Medication information sheet(S) including potential side effects  []Ashleys egress test  []Pain Ball management  []FAQ Catheter associated blood stream infections  []FAQ Surgical Site Infections  []Other-    I have read and understand the instructions given to me: ____________________________________________   (Patient/S.O. Signature)            Date/time 10/28/2022 11:47 AM         PACU:  [phone]   M-F 700 AM - 7 PM      SAME DAY SERVICES:  [phone] M-F 7AM-6PM        If you smoke STOP. We care about your health!

## 2022-10-28 NOTE — ANESTHESIA PROCEDURE NOTES
Peripheral Block    Patient location during procedure: pre-op  Reason for block: post-op pain management and at surgeon's request  Start time: 10/28/2022 7:21 AM  End time: 10/28/2022 7:26 AM  Staffing  Performed: anesthesiologist   Anesthesiologist: Deya Travis MD  Preanesthetic Checklist  Completed: patient identified, IV checked, site marked, risks and benefits discussed, surgical/procedural consents, equipment checked, pre-op evaluation, timeout performed, anesthesia consent given, oxygen available and monitors applied/VS acknowledged  Peripheral Block   Patient position: sitting  Prep: ChloraPrep  Provider prep: mask and sterile gloves  Patient monitoring: cardiac monitor, continuous pulse ox, frequent blood pressure checks and IV access  Block type: Brachial plexus  Interscalene  Laterality: right  Injection technique: single-shot  Guidance: ultrasound guided    Needle   Needle type: insulated echogenic nerve stimulator needle   Needle gauge: 22 G  Needle localization: ultrasound guidance  Needle length: 8 cm  Assessment   Injection assessment: negative aspiration for heme, no paresthesia on injection, local visualized surrounding nerve on ultrasound and no intravascular symptoms  Paresthesia pain: none  Slow fractionated injection: yes  Hemodynamics: stable  Real-time US image taken/store: yes  Outcomes: uncomplicated and patient tolerated procedure well    Additional Notes  Immediately prior to procedure a \"time out\" was called to verify the correct patient, allergies, laterality, procedure and equipment. Time out performed with RN. Local Anesthetic: See below    Anterior scalene and middle scalene muscles, upper, middle and lower trunks of the brachial plexus are identified, the tip of the needle and the spread of the local anesthetic around the brachial plexus are visualized. The Brachial plexus appeared to be anatomically normal and there were no abnormal pathologically findings seen.      Right Ultrasound-Guided Interscalene Brachial Plexus Block    Indication: Postoperative analgesia upon request of the attending surgeon. Immediately prior to procedure a \"time out\" was called to verify the correct patient, allergies, laterality, procedure and equipment. Time out performed with RN. Local Anesthetic: See below    Anterior scalene and middle scalene muscles, upper, middle and lower trunks of the brachial plexus are identified, the tip of the needle and the spread of the local anesthetic around the brachial plexus are visualized. The Brachial plexus appeared to be anatomically normal and there were no abnormal pathologically findings seen. Procedure: Informed consent obtained and pre-procedural timeout procedure performed. Patient supine in semi-upright position. Landmarks identified. Sterile prep. Right brachial plexus was identified using an ultrasound probe and a 22G 80mm insulated regional block needle was inserted posterior to the right interscalene groove at the level of the C6 tubercle and directed in an anterior manner toward the brachial plexus. The needle was visualized on ultrasound as it entered the plexus sheath. Bupivacaine 0.5%-20cc and Exparel 1.3%-10cc were mixed together and injected in 5cc increments to a total volume of 30cc after aspiration was performed prior to each increment and found to be negative for both heme and CSF. Block was tolerated well by the patient. There were no apparent complications at the time of the block. Medications Administered  bupivacaine (PF) 0.5 % - Perineural   20 mL - 10/28/2022 7:25:00 AM  bupivacaine liposome 1.3 % - Perineural   10 mL - 10/28/2022 7:25:00 AM      Tyrone San.  [facility] MD DIANA  October 28, 2022 7:34 AM

## 2022-10-28 NOTE — PROGRESS NOTES
Ambulatory Surgery/Procedure Discharge Note    Vitals:    10/28/22 1404   BP: 130/78   Pulse: 81   Resp: 20   Temp: 97.4 °F (36.3 °C)   SpO2: 94%       In: 1000 [I.V.:1000]  Out: 300     Restroom use offered before discharge. Yes    Pain assessment:  level of pain (1-10, 10 severe)  Pain Level: 2, pt states pain tolerable for discharge  Pt to [facility] post right shoulder arthrotomy, open lysis of adhesions, removal of loose bodies, removal of retained deep suture anchor, prosthetic hemiarthroplasty with glenoid reaming ( ream and run procedure) with patient specific instrumentation- Right. Sling in place, ice to site, pad for ice man machine in place. Capillary refill brisk to operative extremity. Pt c/o surgical pain 2/10 at this time, pt medicated per PACU RN. Pt denies nausea at this time, PO fluids refused per pt request.   Discharge instructions given to pt's wife and she states understanding of these instructions. Pt states that he is \"ready to go. \"         Patient discharged to home/self care.  Patient discharged via wheel chair by transporter to waiting family/S.O.       10/28/2022 2:52 PM

## 2022-10-28 NOTE — H&P
Lesia Cedillo    [id number]    [facility] Same Day Surgery Update H & P  Department of General Surgery   Surgical Service   Pre-operative History and Physical  Last H & P within the last 30 days. DIAGNOSIS:   Post-traumatic osteoarthritis, right shoulder [M19.111]    Procedure(s):  RIGHT TOTAL SHOULDER REPLACEMENT WITH REAM AND RUN PROCEDURE     History obtained from: Patient interview and EHR     HISTORY OF PRESENT ILLNESS:   Patient is a morbidly obese (Body mass index is 42.85 kg/m².), 44 y.o. male  c/o right shoulder pain, weakness and limited ROM in the setting of glenohumeral arthrosis. Their symptoms have been recalcitrant to conservative treatment and the patient presents today for the above procedure. Illness Screening: Patient denies fever, chills, worsening cough, or close contact with sick individuals. Past Medical History:        Diagnosis Date    Hypertension 12/11/2017    preop undiagnosed    Overweight      Past Surgical History:        Procedure Laterality Date    OTHER SURGICAL HISTORY Right 12/11/2017    RIGHT SHOULDER EUA, DIAGNOSTIC ARTHROSCOPY, ARTHROSCOPIC    SHOULDER SURGERY Right 12/11/2017    shoulder surgery x3    TONSILLECTOMY AND ADENOIDECTOMY      WISDOM TOOTH EXTRACTION         Medications Prior to Admission:      Prior to Admission medications    Medication Sig Start Date End Date Taking? Authorizing Provider   acetaminophen (TYLENOL) 500 MG tablet Take 500 mg by mouth every 6 hours as needed for Pain    Historical Provider, MD         Allergies:  Patient has no known allergies.     PHYSICAL EXAM:      BP (!) 164/92   Pulse 72   Temp 98.2 °F (36.8 °C) (Temporal)   Resp 18   Ht 6' 3\" (1.905 m)   Wt (!) 342 lb 12.8 oz (155.5 kg)   SpO2 96%   BMI 42.85 kg/m²      Airway:  Airway patent with no audible stridor    Heart:  Regular rate and rhythm, No murmur noted    Lungs:  No increased work of breathing, good air exchange, clear to auscultation bilaterally, no crackles or wheezing    Abdomen:  Soft, non-distended, non-tender, no masses palpated    ASSESSMENT AND PLAN    Patient is a 44 y.o. male with above specified procedure planned. 1.  The patients history and physical was obtained and signed off by the pre-admission testing department. Patient seen and focused exam done today- no new changes since last physical exam on 10/3/22    2. Access to ancillary services are available per request of the provider.     POOL Londono - CNP     10/28/2022

## 2022-10-28 NOTE — PROGRESS NOTES
Admitted to pacu at this time. VSS on monitor. Report given by CRNA. Surgery uneventful.  Received a nerve block at 7:30 this am

## 2022-10-28 NOTE — PROGRESS NOTES
PACU Transfer to [facility]    Vitals:    10/28/22 1215   BP: (!) 158/99   Pulse: 88   Resp: 25   Temp: T96.8   SpO2: 94%         Intake/Output Summary (Last 24 hours) at 10/28/2022 1324  Last data filed at 10/28/2022 1042  Gross per 24 hour   Intake 1000 ml   Output 300 ml   Net 700 ml       Pain assessment:  none  Pain Level: 0    Patient transferred to care of [facility] RN.    10/28/2022 1:24 PM

## 2022-10-28 NOTE — PROGRESS NOTES
Physical Therapy  Facility/Department: [address] [place] [address] GENERAL SURGERY  Physical Therapy Initial Assessment and Treatment/Discharge    Name: Marla Zhao  : 1985  MRN: [id number]  Date of Service: 10/28/2022    Discharge Recommendations:    Marla Zhao scored a 19/24 on the AM-PAC short mobility form. Current research shows that an AM-PAC score of 18 or greater is typically associated with a discharge to the patient's home setting. Based on the patient's AM-PAC score and their current functional mobility deficits, it is recommended that the patient have 2-3 sessions per week of Physical Therapy at d/c to increase the patient's independence. At this time, this patient demonstrates the endurance and safety to discharge home with home vs OP services and a follow up treatment frequency of 2-3x/wk. Please see assessment section for further patient specific details. If patient discharges prior to next session this note will serve as a discharge summary. Please see below for the latest assessment towards goals. PT Equipment Recommendations  Equipment Needed: No      Patient Diagnosis(es): The encounter diagnosis was S/P shoulder replacement, right. Past Medical History:  has a past medical history of Hypertension and Overweight. Past Surgical History:  has a past surgical history that includes shoulder surgery (Right, 2017); Tonsillectomy and adenoidectomy; Mammoth tooth extraction; and other surgical history (Right, 2017). Assessment   Assessment: Pt s/p RIGHT SHOULDER ARTHROTOMY, OPEN LYSIS OF ADHESIONS, REMOVAL OF LOOSE BODIES, REMOVAL OF RETAINED DEEP SUTURE ANCHOR, PROSTHETIC HEMIARTHROPLASTY WITH GLENOID REAMING (REAM AND RUN PROCEDURE) WITH PATIENT SPECIFIC INSTRUMENTATION. Normally independent with mobility. Currently needing SB/supervision. Planning on returning home and has no concerns about managing. Will have 24 hr assist initailly. Rec cont skilled PT per Dr Madhuri Jacobs.   Decision Making: Low Complexity  Requires PT Follow-Up: Yes     Plan   Physcial Therapy Plan  General Plan: Discharge with evaluation only  Safety Devices  Type of Devices: Nurse notified (left on stretcher in PACU)     Restrictions  Position Activity Restriction  Other position/activity restrictions: NWB, activity as tolerated, sling at all times     Subjective   General  Additional Pertinent Hx: Pt is a 44 y.o. male s/p RIGHT SHOULDER ARTHROTOMY, OPEN LYSIS OF ADHESIONS, REMOVAL OF LOOSE BODIES, REMOVAL OF RETAINED DEEP SUTURE ANCHOR, PROSTHETIC HEMIARTHROPLASTY WITH GLENOID REAMING (REAM AND RUN PROCEDURE) WITH PATIENT SPECIFIC INSTRUMENTATION on 10/28     PMH:  HTN, R shoulder surgery 2017  Referring Practitioner: Salvador Guadalupe DO  Referral Date : 10/28/22  Subjective  Subjective: Pt found supine on stretcher in PACU. Agreeable to PT.    No c/o pain         Social/Functional History  Social/Functional History  Lives With: Spouse  Type of Home: House  Home Layout: Two level, Able to Live on Main level with bedroom/bathroom  Home Access: Stairs to enter without rails  Entrance Stairs - Number of Steps: 2  Bathroom Toilet: Handicap height (sink next to toilet for leverage if needed)  Bathroom Equipment: Hand-held shower, Grab bars in shower, Tub transfer bench  Home Equipment:  (no AE prior)  Has the patient had two or more falls in the past year or any fall with injury in the past year?: No  ADL Assistance: [address] [place] [address] Avenue: Independent (shares duties with wife, children)  Ambulation Assistance: Independent  Transfer Assistance: Independent  Active : Yes  Type of Occupation: works for the [employer] of [address] [place] [address]- plans to be off work for Nov.  Additional Comments: will have 24 hr initially  Vision/Hearing  Vision  Vision: Within Functional Limits  Hearing  Hearing: Within functional limits    Cognition   Orientation  Overall Orientation Status: Within Functional Limits     Objective                 AROM RLE (degrees)  RLE AROM: WFL  AROM LLE (degrees)  LLE AROM : WFL  Strength RLE  Strength RLE: WFL  Strength LLE  Strength LLE: WFL           Bed mobility  Supine to Sit: Stand by assistance  Sit to Supine: Stand by assistance  Transfers  Sit to Stand: Stand by assistance  Stand to Sit: Supervision  Ambulation  Device: No Device  Assistance: Stand by assistance (to supervision)  Quality of Gait: steady without LOB, decreased emilia  Distance: 30' x 2   Patient educated on shoulder immobilizer; including purpose, donning/doffing, fit/positioning, and wear schedule as ordered by surgical team. Patient verbalized and performed return demonstration, confirming understanding.         Treatment included: bed mobility, transfers, gt, pt education          OutComes Score                                                  AM-PAC Score  AM-PAC Inpatient Mobility Raw Score : 19 (10/28/22 1325)  AM-PAC Inpatient T-Scale Score : 45.44 (10/28/22 1325)  Mobility Inpatient CMS 0-100% Score: 41.77 (10/28/22 1325)  Mobility Inpatient CMS G-Code Modifier : CK (10/28/22 1325)          Tinneti Score       Goals  Short Term Goals  Time Frame for Short Term Goals: No goals set - pt SB/supervision       Education  Patient Education  Education Given To: Patient  Education Provided: Role of Therapy;Plan of Care;Precautions  Education Method: Verbal  Education Outcome: Verbalized understanding      Therapy Time   Individual Concurrent Group Co-treatment   Time In 1233         Time Out 7883         Minutes 23               Timed Code Treatment Minutes:8       Total Treatment Minutes:  [address], PT

## 2022-10-29 NOTE — OP NOTE
[facility] [address], [address]                                OPERATIVE REPORT    PATIENT NAME: Wilma Felipe                        :        1985  MED REC NO:   [id number]                          ROOM:  ACCOUNT NO:   [de-identified]                           ADMIT DATE: 10/28/2022  PROVIDER:     Ben Urrutia MD    DATE OF PROCEDURE:  10/28/2022    PREOPERATIVE DIAGNOSIS:  End-stage glenohumeral osteoarthritis with  retained suture anchors from prior stabilization of right shoulder. POSTOPERATIVE DIAGNOSES:  End-stage glenohumeral osteoarthritis with  retained suture anchors from prior stabilization of right shoulder with  massive osteophytes and postsurgical scaring. PROCEDURES PERFORMED:  Right shoulder examination under anesthesia,  arthrotomy, open biceps adhesions, removal of deep implant (glenoid  suture anchor), removal of loose bodies, prosthetic right shoulder  hemiarthroplasty with concentric glenoid reaming (ream and run procedure  equivalent to total shoulder arthroplasty without exertion of glenoid  implant), use of PSI guide. Modifier 22 applies because the patient weighs 340 pounds with a BMI  exceeding 42. This complicated all elements of procedure requiring  doubling of the time for exposure particularly on the glenoid side  resection of the osteophytes. He had a very enlarged humeral head that  took additional time to prepare or sculpt around the largest humeral  head implant we had. All of this doubled the time that would otherwise  take for an uncomplicated ream and run procedure. ANESTHESIA:  General anesthesia, interscalene block. IV FLUIDS:  1000 mL of crystalloids. ESTIMATED BLOOD LOSS:  200 mL. COMPLICATIONS:  None. SURGEON:  Ben Urrutia MD    ASSISTANT:  Nara Floyd DO.   The availability of Dr. Janett Mi as a skilled first assistant was   critically important to execute this surgery safely and efficiently. The fellow assisted with glenoid and humeral exposure and with   suture management during subscapularis repair. This was especially   helpful given the patient's large and muscular habitus. IMPLANTS:  #3 CS Edge with 56 x 22 humeral head, neutral neck, multiple  FiberWire sutures. FINDINGS:  Examination under anesthesia revealed evidence of prior  arthrotomy, no evidence of infection. There was marked deficit in range  of motion. Arthrotomy revealed massive humeral head with large  osteophytes anteriorly, inferiorly, and posteriorly. This required  removal of osteophytes and then sculpting. The rotator cuff was intact. Subscapularis was intact. There was extensive postsurgical scarring. The biceps had been tenodesed. The glenoid had mild-to-moderate  retroversion with significant concentric erosion. When we reamed and  sculpted for the ream and run procedure, we did uncover one of the metal  anchors and a little bit of suture so that the anchors were a little  further deep. We had to remove one of the anchors and we had to bone  graft the defect with autograft bone graft. There were no other obvious  anomalous findings. We were able to maintain good mobility and  stability and the subscapularis was closed using the double-row  technique. BRIEF HISTORY AND PRESENTING ILLNESS:  The patient is a 27-year-old  gentleman who underwent open stabilization many years ago and developed  significant arthrosis pretty early in life. Roughly around 16 or 18  years ago, at age 20, I did an extensive debridement. I did a biceps  tenodesis. He went on to do very well. He did well and we monitored  him with sequential radiographs. Finally, his shoulder started to wear  out, he had large osteophytes and loss of range of motion. We reached a  stabling point and we had discussed ream and run procedure, he seemed to  be an excellent candidate now at age of 44.   He is still very active,  wanted to  football. He is a very large man, mostly muscular with  a BMI of 42. We counseled with him regards some risks. Still, he  wished to have definitive treatment. I felt this is reasonable. We did  preoperative MRI and CT. We elected to proceed with ream and run  procedure using stemless implant as the bone would support it. He  understood all of this. Understood likely the need for further surgery  later in life. He understood the risks such as bleeding, infection,  anesthetic risk, injury to nerve and blood vessels, stiffness, weakness,  and incomplete pain relief as well. He understood all of these and  understood the importance of protecting his shoulder in postoperative  rehab. He was scheduled on an elective basis after appropriate  preoperative medical clearance and gave informed consent preoperatively. OPERATIVE PROCEDURE:  On the date of the procedure, the patient was  brought to the operating room, placed supine on the operating table. General anesthesia was established. Preoperative antibiotics and  interscalene block had been given in the holding area. Under  anesthesia, exam was carried out with the findings as above. The  patient was positioned using a standard beach-chair position in a  semi-sitting position, trunk elevated 45 degrees. All pressure points  were padded. We marked our previous incision. I elected to use it. Prepped and draped the patient's right upper extremity in a standard  manner for shoulder replacement surgery. We used [place] to cover the  operative field. We used [employer] Spider arm positioner for arm  positioning. All members of the surgical team wore body exhaust suits. We called timeout and then used the same incision which was a little bit  more lateral.  We extended it a little bit proximally and then extended  it to about 4 to 5 cm distally.   This was carried out with a skin knife  through skin and subcutaneous tissue. Electrocautery was used to  establish hemostasis. Gelpi retractor was placed. Deltopectoral  interval was identified and developed using a combination of sharp and  blunt dissection. The cephalic vein was not identified and likely  addressed at previous surgery. We retracted deltoid laterally. We  placed Hohmann retractors, placed a large, round deltoid retractor. We  excised quite a bit of scar. We could see large osteophytes. It was  really hard to see the bicipital groove because of the enlargement of  the osteophytes. Still, we could identify the base of coracoid, the  rotator interval tissue; so, we simply made a slit to rotator interval  and went down the humerus. We were just a little bit medial, so we  ended up with a side-to-side repair of the subscapularis in addition to  transosseous. We released the subscapularis sharply covering very large  osteophytes and releasing inferior capsule. We were able to dislocate  the humeral head. The dislocation actually came really easily once we  released subdeltoid subacromial adhesions and released inferior capsule. At this point, we began to proximally remove all the osteophytes  circumferentially. We used half inch curved osteotome and rongeur. We  used a 4-mm oval estrella as well. We used osteotome anteriorly to remove  some overhanging osteophytes so we could place our guide. We placed our  cutting guide in appropriate height and retroversion and pinned the  guide in place. We used an oscillating saw to resect the humeral head. We preserved the rotator cuff attachment. Large additional osteophytes  came into view posteriorly and posteromedially and these were all  resected. We excised the humerus and a 56 x 22 which was largest was  still quite small. It was just about right from front to back and  superiorly we placed it, drilled our wire using the humeral head trial  as a guide.   We then reamed the face of the cut surface of the bone for  the CS Edge. We incised a #3, so we impacted that in place and then  inserted our 56 x 22 trial head. This helped to gauge how much bone we  still had to remove inferiorly, so we simply used a estrella and placed our  retractors all the way around it sequentially and proceeded to contour  the proximal humerus in metaphyseal area, trying to preserve as much as  cortical bone but also removing the osteophyte so that there would not  be any impingement or overhang. We did this fluently. This also helped  debulk the humerus for retraction. We irrigated removing any bony  debris. Removed the trial head and replaced it with a large bone  protector. We screwed that in and then turned our attention towards the  glenoid. We excised the rotator interval scar, excised the anterior  capsule, mobilized the subscapularis adequately. We excised some of the  scar. There were large osteophytes where the labrum had been. We  carried out our release all the way. Anteroinferiorly, there were very  large osteophytes and so we removed these with our small rongeur and a  estrella. We could not see any suture anchors. We removed all the soft  tissue around the base of coracoid so we could place our PSI guide. We  placed it in and drilled our centerline. We had to make a slight  correction in inversion, but this position was perfect and then we  reamed with a 56 reamer, reaming concentric, predominantly, anteriorly,  inferiorly, and a little bit anterosuperiorly. We reamed until we could  ream a little bit posteriorly. We used a estrella to remove some of the  overhanging osteophytes anteriorly and inferiorly. We did partial  inversion correction. We irrigated copiously. At this point, we noted  that there was one anchor on top of it, so we had to use a wire passing  drill and drill around and remove it. We removed most of the anchor  bulk.  We irrigated it copiously, impacted some cancellous bone harvested   from the resected humeral head into the small defect and also into the  small weep hole. At this point, we were really happy with our overall  glenoid prep. We trialed the 56 x 22 head, which fit really well. The  subscapularis mobilized nicely and we had a good fill of the reamed  glenoid surface. At this point, we dislocated the humeral head. We  removed all the sharp components. We irrigated it copiously. Placed a  total of 9 sutures, six #2 FiberWire and three #2 Ethibond sutures  starting at the bicipital groove and also passing transosseously. We  had a good bite. We tried to make sure a good cortical bite or we were  going to remove some osteophytes anteroinferiorly. These were used for  subscapularis repair. We inserted #3 Edge, impacted down in place,  inserted our neck and then inserted and impacted 56 x 22 head. We made  sure it was stable and the Toyin Fatima taper engaged. We reduced the shoulder  one final time. Passed suture at the osteotomy site to the  subscapularis. We passed from top to bottom and tied in that manner  getting the arm to about 45 degrees of external rotation by the time we  tied the inferior sutures, brought the arm a little bit internally  rotated and where there was a still a flap of subscapularis tendon  superiorly we placed three additional #2 Ethibond side-to-side sutures  to provide a second layer of closure. We had a very nice tendon  apposition. We irrigated copiously. We then closed the wound in layers  with 1 gm of vancomycin powder. We used 0 Vicryl in figure-of-eight  fashion to close the deltopectoral interval, 2-0 Vicryl in interrupted  inverted fashion to close the subcutaneous tissue. Routine skin closure  with 4-0 Monocryl and Prineo dressing to close the skin. Sterile  compressive dressing was applied. The arm was placed in a padded soft  brace.   The patient was repositioned in the supine position before this  and promptly awakened from anesthesia having tolerated the procedure  well and was taken from the operating room to the recovery room in  satisfactory condition. PLAN:  The patient will be given the option of discharge home on oral  analgesics with instructions to follow in outpatient therapy. If he  does not meet discharge metrics, he will spend the night and go home the  next morning. Follow up in the office in one week.         Thais Medley MD    D: 10/28/2022 11:29:35       T: 10/28/2022 15:50:35     [email or web address]  Job#: [id number]     Doc#: [id number]    CC:

## 2022-10-31 ENCOUNTER — TELEPHONE (OUTPATIENT)
Dept: ORTHOPEDIC SURGERY | Age: 37
End: 2022-10-31

## 2022-10-31 NOTE — TELEPHONE ENCOUNTER
Called patient for postoperative evaluation after Right total shoulder surgery 10/28/22. Unable to reach patient, left voicemail. Instructed patient to call for any questions or concerns.     Rayo Benavides Nurse Navigator  [phone]

## 2022-11-02 ENCOUNTER — OFFICE VISIT (OUTPATIENT)
Dept: ORTHOPEDIC SURGERY | Age: 37
End: 2022-11-02

## 2022-11-02 VITALS — HEIGHT: 75 IN | WEIGHT: 315 LBS | BODY MASS INDEX: 39.17 KG/M2

## 2022-11-02 DIAGNOSIS — Z96.611 STATUS POST TOTAL SHOULDER REPLACEMENT, RIGHT: Primary | ICD-10-CM

## 2022-11-02 DIAGNOSIS — M19.111 POST-TRAUMATIC OSTEOARTHRITIS OF RIGHT SHOULDER: ICD-10-CM

## 2022-11-02 PROCEDURE — 99024 POSTOP FOLLOW-UP VISIT: CPT | Performed by: ORTHOPAEDIC SURGERY

## 2022-11-02 NOTE — LETTER
Shoulder Elbow Rehabilitation Referral    Patient Name: Nancy Frost      YOB: 1985    Diagnosis:   1. Post-traumatic osteoarthritis of right shoulder    2. Status post total shoulder replacement, right    Ream and run procedure on 10/28/22    Precautions: subscapularis, NWB right UE    Post Op Instructions:  [x] Continuous passive motion (CPM)  [x] Passive Elbow range of motion  [] Exercise in plane of scapula   []  Strengthening     [] Pulley and instruction    [x] Home exercise program (copy to patient)   [] Sling when arm at risk  [x] Sling or brace at all times   [x] AAROM: Forward elevation to 90            [x] AAROM: External rotation to 0    [] Isometric external rotator strengthening [] AAROM: internal rotation: up the back  [x] Isometric abductor strengthening  [] AAROM: Internal abduction     [] Isometric internal rotator strengthening [] AAROM: cross-body adduction             Stretching:     Strengthening:  [] Four quadrant (FE, ER, IR, CBA)  [] Rotator cuff (ER, IR, Abd)  [] Forward Elevation    [] External Rotators     [] External Rotation    [] Internal Rotators  [] Internal Rotation: up/back   [] Abductors     [] Internal Rotation: supine in abduction  [] Flexors  [] Cross-body abduction    [] Extensors  [x] Pendulum (FE, Abd/Add, cw/ccw)  [x] Scapular Stabilizers   [] Wall-walking (FE, Abd)    [x] Shoulder shrugs     [] Table slides      [x] Rhomboid pinch  [] Elbow (flex, ext, pron, sup)    [] Lat.  Pull downs     [] Medial epicondylitis program    [] Forward punch   [] Lateral epicondylitis program    [] Internal rotators     [] Progressive resistive exercises  [] Bench Press        [] Bench press plus  Activities:     [] Lateral pull-downs  [] Rowing     [] Progressive two-hand supine press  [] Stepper/Exercise bike   [] Biceps: curls/supination  [] Swimming  [] Water exercises    Modalities: PRN    Return to Sport:  [] Ultrasound     [] Plyometrics  [] Iontophoresis     [] Rhythmic stabilization  [] Moist heat     [] Core strengthening   [] Massage     [] Sports specific program:   [x] Cryotherapy      [] Electrical stimulation     [] Paraffin  [] Whirlpool  [] TENS    [x] Home exercise program (copy to patient). Perform exercises for:   15     minutes    2-3      times/day  [x] Supervised physical therapy  Frequency: []  1x week  [x] 2x week  [] 3x week  [] Other:   Duration: [] 2 weeks   [] 4 weeks  [x] 6 weeks  [] Other:     Additional Instructions:   May progress Forward Elevation by 10 degrees weekly up to 140 and External Rotation 5 degrees weekly up to 20        Samer SJaz Pavon MD, PhD

## 2022-11-02 NOTE — PROGRESS NOTES
History of Present Illness:  Adolfo Garcia is a pleasant 44 y.o. male who presents for a post operative visit. He is 6 days s/p right ream and run procedure on 10/28/22. Overall He is doing okay and feels that their pain is well controlled with current pain medications. He has been compliant with wearing the UltraSling brace at all times. He plans to do physical therapy at [address]. He denies fevers, chills, numbness, tingling, and shortness of breath. He is present today with a family member. He states he has not yet received the [facility] chair. Medical History:  Patient's medications, allergies, past medical, surgical, social and family histories were reviewed and updated as appropriate. No notes on file    Review of Systems  A 14 point review of systems was completed by the patient and is available in the media section of the scanned medical record and was reviewed on 11/2/2022. Vital Signs: There were no vitals filed for this visit. General/Appearance: Alert and oriented and in no apparent distress. Skin:  There are no skin lesions, cellulitis, or extreme edema. The patient has warm and well-perfused Bilateral upper extremities with brisk capillary refill. Right Shoulder Exam:    Inspection: Shoulder incision is clean, dry and intact and well approximated. The Prineo dressing is still in place. Mild ecchymosis and swelling are present as can be expected. There is no erythema, drainage or other signs of infection    Palpation:  No crepitus to gentle motion    Active Range of Motion: Deferred    Passive Range of Motion:  tolerates gentle passive motion well    Strength:  Deferred    Special Tests:  Deferred.     Neurovascular: Sensation to light touch is intact, no motor deficits, palpable radial pulses 2+    Radiology:     Plain radiographs of the right shoulder comprising 2 views: AP and Axillary were obtained and reviewed in the office: Shows postsurgical changes from the ream and run procedure. All the components are in good placement without any signs of loosening, fractures, subluxations or dislocations. Impression: Stable postop x-ray. Assessment :  Mr. Dorcas Garcia is a pleasant 44 y.o. patient who is 6 days s/p right ream and run procedure on 10/28/22. Impression:  Encounter Diagnoses   Name Primary? Post-traumatic osteoarthritis of right shoulder     Status post total shoulder replacement, right Yes       Office Procedures:  Orders Placed This Encounter   Procedures    XR SHOULDER RIGHT (MIN 2 VIEWS)     Standing Status:   Future     Number of Occurrences:   1     Standing Expiration Date:   11/2/2023     Order Specific Question:   Reason for exam:     Answer:   PO    Amb External Referral To Physical Therapy     Referral Priority:   Routine     Referral Type:   Consult for Advice and Opinion     Referral Reason:   Patient Preference     Requested Specialty:   Orthopedic Physical Therapist     Number of Visits Requested:   1       Treatment Plan:    Overall Dorcas Garcia is doing well. The pain is well-controlled. We recommend that He wear the UltraSling brace at all times with the exception of clothing, bathing and physical therapy. The patient was told that he is restricted from driving for at least 3 weeks postop. We would like for him to go ahead and begin formal physical therapy. All of his questions were fully answered today. We will have someone reach out to him regarding the chair. We would like to see Dorcas Garcia back in 2 weeks for follow-up visit. Dorcas Garcia is in agreement with this plan. 11/2/2022  11:24 AM    John Colunga ATC  [facility] [address] [facility]    During this examination, I, Lacy Gibson, functioned as a scribe for Dr. Arturo Smith. The history taking and physical examination were performed by Dr. Teto Rutherford.  All counseling during the appointment was performed between the patient and Dr. Madhuri Jacobs. 11/2/2022  ______________________    I, Dr. Rowland Friend, personally performed the services described in this documentation as described by María Morales ATC in my presence, and it is both accurate and complete. Lori Jacobs MD, PhD  11/2/2022

## 2022-11-09 ENCOUNTER — TELEPHONE (OUTPATIENT)
Dept: ORTHOPEDIC SURGERY | Age: 37
End: 2022-11-09

## 2022-11-14 ENCOUNTER — OFFICE VISIT (OUTPATIENT)
Dept: ORTHOPEDIC SURGERY | Age: 37
End: 2022-11-14

## 2022-11-14 DIAGNOSIS — Z96.611 STATUS POST TOTAL SHOULDER REPLACEMENT, RIGHT: Primary | ICD-10-CM

## 2022-11-14 PROCEDURE — 99024 POSTOP FOLLOW-UP VISIT: CPT | Performed by: PHYSICIAN ASSISTANT

## 2022-11-14 NOTE — LETTER
Physical Therapy Rehabilitation Referral    Patient Name:  Terence Grant      YOB: 1985    Diagnosis:    1. Status post total shoulder replacement, right        Precautions:  Subscapularis and start these orders after 11/18/2022    [x] Evaluate and Treat    Post Op Instructions:  [] Continuous passive motion (CPM)  [x] Elbow ROM  [x] Exercise in plane of scapula  [x]  Strengthening     [x] Pulley and instruction   [x] Home exercise program (copy to patient)   [x] Sling when arm at risk  [] Sling or brace at all times   [x] AAROM: Forward elevation to  90            [x] AAROM: External rotation  To  20    [] Isometric external rotator strengthening [] AAROM: internal rotation: up the back  [x] Isometric abductor strengthening  [] AAROM: Internal abduction   [] Isometric internal rotator strengthening [] AAROM: cross-body adduction             Stretching:     Strengthening:  [] Four quadrant (FE, ER, IR, CBA)  [] Rotator cuff (ER, IR, Abd)  [x] Forward Elevation    [] External Rotators     [x] External Rotation    [] Internal Rotators  [] Internal Rotation: up/back   [] Abductors     [] Internal Rotation: supine in abduction  [] Sleeper Stretch    [] Flexors  [] Cross-body abduction    [] Extensors  [x] Pendulum (FE, Abd/Add, cw/ccw)  [x] Scapular Stabilizers   [x] Wall-walking (FE, Abd)        [x] Shoulder shrugs     [x] Table slides (FE)                [x] Rhomboid pinch  [] Elbow (flex, ext, pron, sup)        [] Lat.  Pull downs     [] Medial epicondylitis program       [] Forward punch   [] Lateral epicondylitis program       [] Internal rotators     [] Progressive resistive exercises  [] Bench Press        [] Bench press plus  Activities:     [] Lateral pull-downs  [] Rowing     [] Progressive two-hand supine press  [] Stepper/Exercise bike   [] Biceps: curls/supination  [] Swimming  [] Water exercises    Modalities:     Return to Sport:  [x] Of Choice      [] Plyometrics  [] Ultrasound     [] Rhythmic stabilization  [] Iontophoresis    [] Core strengthening   [] Moist heat     [] Sports specific program:   [] Massage         [x] Cryotherapy      [] Electrical stimulation     [] Paraffin  [] Whirlpool  [] TENS    [x] Home exercise program (copy to patient). Perform exercises for:   15     minutes    3      times/day  [x] Supervised physical therapy  Frequency: []  1x week  [x] 2x week  [] 3x week  [] Other:   Duration: [] 2 weeks   [] 4 weeks  [x] 6 weeks  [] Other:     Additional Instructions: please progress with AAFE ROM by 10 degrees weekly as tolerated until he is at 140 and  with AAER ROM by 5 degrees weekly as tolerated until he is at 40,       Lori Lo MD, PhD

## 2022-11-14 NOTE — PROGRESS NOTES
History of Present Illness:  Makayla Simmons is a 44 y.o. male who presents for a post operative visit. The patient underwent a right hemiarthroplasty with a stemless humeral component with a ream and run procedure on 10/28/2022. Denies any pain. He reports has been compliant with his sling and restrictions. Going to physical therapy closer to home. The patient deny fevers, chills, numbness, tingling, and shortness of breath. Medical History:  Patient's medications, allergies, past medical, surgical, social and family histories were reviewed and updated as appropriate. No notes on file    Review of Systems  A 14 point review of systems was completed by the patient is available in the media section of the scanned medical record and was reviewed on 11/14/2022. Vital Signs: There were no vitals filed for this visit. General/Appearance: Alert and oriented and in no apparent distress. Skin:  There are no skin lesions, cellulitis, or extreme edema. The patient has warm and well-perfused Bilateral upper extremities with brisk capillary refill.      right Shoulder Exam:    Inspection: right shoulder incision that is clean, dry and intact and well approximated. The rash and redness has improved very much. Mild ecchymosis and swelling are present as can be expected. There is no erythema, drainage or other signs of infection    Palpation:  No crepitus to gentle motion    Active Range of Motion: Deferred    Passive Range of Motion: Forward elevation of 120, external rotation of 5 degrees, soft endpoint    Strength:  Deferred    Special Tests:  Deferred. Neurovascular: Sensation to light touch is intact, no motor deficits, palpable radial pulses 2+    Radiology:     Plain radiographs not obtained today. Assessment :  Mr. Makayla Simmons is a 44 y.o. patient underwent a right hemiarthroplasty with a stemless humeral component with a ream and run procedure on 10/28/2022.          Impression:  Encounter Diagnosis   Name Primary? Status post total shoulder replacement, right Yes       Office Procedures:  No orders of the defined types were placed in this encounter. Treatment Plan:    Overall the patient is doing well. The pain is well-controlled. We recommend that he may discontinue the bump and the sling at home after this coming Friday. After 3 weeks postop he may wear the sling primarily when he is outdoors in big crowds. He was encouraged to maintain strict nonweightbearing with the right upper extremity. He may resume driving a car without a sling at this time. Therapy orders were updated and printout was handed to the patient. All his questions were fully answered today. We will see him back in 3 weeks for a follow-up visit. 3:47 PM      Christopher Daily PA-C  Orthopaedic Sports Medicine Physician Assistant      This dictation was performed with a verbal recognition program [employer]) and it was checked for errors. It is possible that there are still dictated errors within this office note. If so, please bring any errors to my attention for an addendum. All efforts were made to ensure that this office note is accurate.

## 2022-12-12 ENCOUNTER — OFFICE VISIT (OUTPATIENT)
Dept: ORTHOPEDIC SURGERY | Age: 37
End: 2022-12-12

## 2022-12-12 VITALS — BODY MASS INDEX: 39.17 KG/M2 | HEIGHT: 75 IN | WEIGHT: 315 LBS

## 2022-12-12 DIAGNOSIS — Z96.611 STATUS POST TOTAL SHOULDER REPLACEMENT, RIGHT: Primary | ICD-10-CM

## 2022-12-12 PROCEDURE — 99024 POSTOP FOLLOW-UP VISIT: CPT | Performed by: PHYSICIAN ASSISTANT

## 2022-12-12 NOTE — LETTER
Physical Therapy Rehabilitation Referral    Patient Name:  Eliza Bermudez      YOB: 1985    Diagnosis:    1. Status post total shoulder replacement, right        Precautions:     [x] Evaluate and Treat    Post Op Instructions:  [] Continuous passive motion (CPM)  [x] Elbow ROM  [x] Exercise in plane of scapula  [x]  Strengthening     [x] Pulley and instruction   [x] Home exercise program (copy to patient)   [] Sling when arm at risk  [] Sling or brace at all times   [x] AAROM: Forward elevation to  140            [x] AAROM: External rotation  To  40    [] Isometric external rotator strengthening [x] AAROM: internal rotation: up the back  [x] Isometric abductor strengthening  [x] AAROM: Internal abduction   [] Isometric internal rotator strengthening [x] AAROM: cross-body adduction             Stretching:     Strengthening:  [] Four quadrant (FE, ER, IR, CBA)  [] Rotator cuff (ER, IR, Abd)  [x] Forward Elevation    [] External Rotators     [x] External Rotation    [] Internal Rotators  [x] Internal Rotation: up/back   [] Abductors     [x] Internal Rotation: supine in abduction  [] Sleeper Stretch    [] Flexors  [] Cross-body abduction    [] Extensors  [x] Pendulum (FE, Abd/Add, cw/ccw)  [x] Scapular Stabilizers   [x] Wall-walking (FE, Abd)        [x] Shoulder shrugs     [x] Table slides (FE)                [x] Rhomboid pinch  [] Elbow (flex, ext, pron, sup)        [] Lat.  Pull downs     [] Medial epicondylitis program       [] Forward punch   [] Lateral epicondylitis program       [] Internal rotators     [] Progressive resistive exercises  [] Bench Press        [] Bench press plus  Activities:     [] Lateral pull-downs  [] Rowing     [x] Progressive two-hand supine press  [] Stepper/Exercise bike   [x] Biceps: curls/supination  [] Swimming  [] Water exercises    Modalities:     Return to Sport:  [x] Of Choice      [] Plyometrics  [] Ultrasound     [] Rhythmic stabilization  [] Iontophoresis    [] Core strengthening   [] Moist heat     [] Sports specific program:   [] Massage         [x] Cryotherapy      [] Electrical stimulation     [] Paraffin  [] Whirlpool  [] TENS    [x] Home exercise program (copy to patient). Perform exercises for:   15     minutes    3      times/day  [x] Supervised physical therapy  Frequency: []  1x week  [x] 2x week  [] 3x week  [] Other:   Duration: [] 2 weeks   [] 4 weeks  [x] 6 weeks  [] Other:     Additional Instructions:     Lori Rodriguez MD, PhD

## 2022-12-13 ENCOUNTER — TELEPHONE (OUTPATIENT)
Dept: ORTHOPEDIC SURGERY | Age: 37
End: 2022-12-13

## 2022-12-13 NOTE — TELEPHONE ENCOUNTER
Florentino French from [facility] with questions for patients therapy.  Please call [phone]

## 2022-12-15 NOTE — TELEPHONE ENCOUNTER
Sagar Cedillo call back and would like for the office to give her a call today if possible regarding this patient. Please Advise.

## 2023-01-11 ENCOUNTER — OFFICE VISIT (OUTPATIENT)
Dept: ORTHOPEDIC SURGERY | Age: 38
End: 2023-01-11

## 2023-01-11 VITALS — BODY MASS INDEX: 39.17 KG/M2 | HEIGHT: 75 IN | WEIGHT: 315 LBS

## 2023-01-11 DIAGNOSIS — Z96.611 STATUS POST TOTAL SHOULDER REPLACEMENT, RIGHT: Primary | ICD-10-CM

## 2023-01-11 PROCEDURE — 99024 POSTOP FOLLOW-UP VISIT: CPT | Performed by: ORTHOPAEDIC SURGERY

## 2023-01-11 NOTE — LETTER
Physical Therapy Rehabilitation Referral    Patient Name:  Shanice Trejo      YOB: 1985    Diagnosis:    1. Status post total shoulder replacement, right        Precautions:     [x] Evaluate and Treat    Post Op Instructions:  [] Continuous passive motion (CPM)  [x] Elbow ROM  [x] Exercise in plane of scapula  [x]  Strengthening     [x] Pulley and instruction   [x] Home exercise program (copy to patient)   [] Sling when arm at risk  [] Sling or brace at all times   [x] AAROM: Forward elevation to  full            [x] AAROM: External rotation  To  full    [] Isometric external rotator strengthening [x] AAROM: internal rotation: up the back  [x] Isometric abductor strengthening  [x] AAROM: Internal abduction   [] Isometric internal rotator strengthening [x] AAROM: cross-body adduction             Stretching:     Strengthening:  [] Four quadrant (FE, ER, IR, CBA)  [] Rotator cuff (ER, IR, Abd) (  [x] Forward Elevation    [x] External Rotators     [] External Rotation    [] Internal Rotators  [x] Internal Rotation: up/back   [x] Abductors     [x] Internal Rotation: supine in abduction  [] Sleeper Stretch    [x] Flexors  [] Cross-body abduction    [] Extensors  [x] Pendulum (FE, Abd/Add, cw/ccw)  [x] Scapular Stabilizers   [x] Wall-walking (FE, Abd)        [x] Shoulder shrugs     [x] Table slides (FE)                [x] Rhomboid pinch  [] Elbow (flex, ext, pron, sup)        [] Lat.  Pull downs     [] Medial epicondylitis program       [] Forward punch   [] Lateral epicondylitis program       [] Internal rotators     [] Progressive resistive exercises  [] Bench Press        [] Bench press plus  Activities:     [] Lateral pull-downs  [] Rowing     [x] Progressive two-hand supine press  [] Stepper/Exercise bike   [x] Biceps: curls/supination  [] Swimming  [] Water exercises    Modalities:     Return to Sport:  [x] Of Choice      [] Plyometrics  [] Ultrasound     [] Rhythmic stabilization  [] Iontophoresis    [] Core strengthening   [] Moist heat     [] Sports specific program:   [] Massage         [x] Cryotherapy      [] Electrical stimulation     [] Paraffin  [] Whirlpool  [] TENS    [x] Home exercise program (copy to patient). Perform exercises for:   15     minutes    3      times/day  [x] Supervised physical therapy  Frequency: []  1x week  [x] 2x week  [] 3x week  [] Other:   Duration: [] 2 weeks   [] 4 weeks  [x] 6 weeks  [] Other:     Additional Instructions:     IR isos at 13 weeks  Periscap strengthening, Deltoid, biceps and triceps ok to start now    Lori Floyd MD, PhD

## 2023-01-11 NOTE — PROGRESS NOTES
History of Present Illness:  Paz Recinos is a 44 y.o. male who presents for a post operative visit. The patient underwent a right hemiarthroplasty with a stemless humeral component with a ream and run procedure on 10/28/2022. He continues to be happy with his recovery and denies pain. He is going to physical therapy closer to his home, and he is progressing. He would like to work more on strength and conditioning. The patient denies any fevers, chills, numbness, tingling, and shortness of breath. Medical History:  Patient's medications, allergies, past medical, surgical, social and family histories were reviewed and updated as appropriate. No notes on file    Review of Systems  A 14 point review of systems was completed by the patient is available in the media section of the scanned medical record and was reviewed on 1/11/2023. Vital Signs: There were no vitals filed for this visit. General/Appearance: Alert and oriented and in no apparent distress. Skin:  There are no skin lesions, cellulitis, or extreme edema. The patient has warm and well-perfused Bilateral upper extremities with brisk capillary refill.      right Shoulder Exam:    Inspection: right shoulder incision that is clean, dry and intact and well approximated. The rash and redness has improved very much. Mild ecchymosis and swelling are present as can be expected. There is no erythema, drainage or other signs of infection    Palpation:  No crepitus to gentle motion    Active Range of Motion: Forward elevation 160, external rotation of 30    Passive Range of Motion:  Forward elevation of 170, external rotation of 40 degrees, soft endpoint    Strength:  4/5 supra 4/5 ER 4/5 IR    Special Tests: Negative [place], negative belly press    Neurovascular: Sensation to light touch is intact, no motor deficits, palpable radial pulses 2+    Radiology:     Plain radiographs of his right shoulder including AP and axillary lateral was obtained and reviewed in the office.  Shows a hemiarthroplasty.  The component is in good placement without any signs of loosening or fractures.    Assessment :  Mr. Terence Blake is a 37 y.o. patient underwent a right hemiarthroplasty with a stemless humeral component with a ream and run procedure on 10/28/2022.         Impression:  Encounter Diagnosis   Name Primary?    Status post total shoulder replacement, right Yes       Office Procedures:  Orders Placed This Encounter   Procedures    XR SHOULDER RIGHT (MIN 2 VIEWS)     Standing Status:   Future     Number of Occurrences:   1     Standing Expiration Date:   1/11/2024     Order Specific Question:   Reason for exam:     Answer:   pain       Treatment Plan:    Overall, the patient is doing very well.  The pain is well-controlled.  He was asked to continue therapy once a week.  We will have him progress to working on strength program.  Physical therapy orders were updated and printout was handed to the patient today. All his questions were fully answered today.  We will see him back in 6 weeks for a follow-up visit.        3:51 PM    Richard Solorazno MD [facility]    Orthopaedic Surgeon, Clinical Fellow  [facility]     The encounter with the patient was supervised by Dr. Rosario who personally examined the patient and reviewed the plan.     This dictation was performed with a verbal recognition program ([employer]) and it was checked for errors.  It is possible that there are still dictated errors within this office note.  If so, please bring any errors to my attention for an addendum.  All efforts were made to ensure that this office note is accurate.  ___________________  I was physically present and personally supervised the Orthopaedic Sports Medicine Fellow in the evaluation and development of a treatment plan for this patient. I personally interviewed the patient and performed a physical examination. In addition, I discussed the  patient's condition and treatment options with them. I have also reviewed and agree with the past medical, family and social history unless otherwise noted. All of the patient's questions were answered. Lori Ibarra MD, PhD  1/11/2023

## 2023-02-22 ENCOUNTER — OFFICE VISIT (OUTPATIENT)
Dept: ORTHOPEDIC SURGERY | Age: 38
End: 2023-02-22
Payer: COMMERCIAL

## 2023-02-22 VITALS — BODY MASS INDEX: 39.17 KG/M2 | WEIGHT: 315 LBS | HEIGHT: 75 IN

## 2023-02-22 DIAGNOSIS — Z96.611 S/P SHOULDER HEMIARTHROPLASTY, RIGHT: Primary | ICD-10-CM

## 2023-02-22 PROCEDURE — 99213 OFFICE O/P EST LOW 20 MIN: CPT | Performed by: ORTHOPAEDIC SURGERY

## 2023-02-22 NOTE — PROGRESS NOTES
History of Present Illness:  Agnieszka uGzman is a 43 y.o. male who presents for a post operative visit. The patient underwent a right hemiarthroplasty with a stemless humeral component with a ream and run procedure on 10/28/2022. He is nearly 4 months postop. He continues to be happy with his recovery. He is going to physical therapy closer to his home, and he is progressing. He notes some overhead weakness and mild deltoid referred pain with these type of movements. The patient denies any fevers, chills, numbness, tingling, and shortness of breath. Medical History:  Patient's medications, allergies, past medical, surgical, social and family histories were reviewed and updated as appropriate. No notes on file    Review of Systems  A 14 point review of systems was completed by the patient is available in the media section of the scanned medical record and was reviewed on 2/22/2023. Vital Signs:  Vitals:       General/Appearance: Alert and oriented and in no apparent distress. Skin:  There are no skin lesions, cellulitis, or extreme edema. The patient has warm and well-perfused Bilateral upper extremities with brisk capillary refill. Right Shoulder Exam:    Inspection: right shoulder incision that is clean, dry and intact and well approximated. The rash and redness has improved very much. Mild ecchymosis and swelling are present as can be expected. There is no erythema, drainage or other signs of infection    Palpation:  No crepitus to gentle motion    Active Range of Motion: Forward elevation 160 vs 170, Abduction 160, external rotation of 30, Internal Rotation L2    Passive Range of Motion: Forward elevation of 170    Strength: [place] 4+/5, ER 4+/5, IR 5/5    Special Tests: Negative [place], negative belly press    Neurovascular: Sensation to light touch is intact, no motor deficits, palpable radial pulses 2+    Radiology:     No new XR obtained at this time.      Assessment :  Mr. Sergio Mcgraw Dolores Morales is a 43 y.o. patient underwent a right hemiarthroplasty with a stemless humeral component with a ream and run procedure on 10/28/2022. Impression:  Encounter Diagnosis   Name Primary? S/P shoulder hemiarthroplasty, right Yes       Office Procedures:  Orders Placed This Encounter   Procedures    Amb External Referral To Physical Therapy     Referral Priority:   Routine     Referral Type:   Consult for Advice and Opinion     Referral Reason:   Patient Preference     Requested Specialty:   Orthopedic Physical Therapist     Number of Visits Requested:   1         Treatment Plan:    Overall, Bernadette Renee has gone on to do very well in his recovery with an excellent clinic result. He should continue in therapy as long as he continues to see gains. He may begin to transition to a home-based program and may transition back to weight lifting using his therapist as his guide. Today we feel confident releasing this patient from our care. We would like to see him back at the [facility] year serg for repeat radiographs and exam. Self assessment questionnaires were sent today via e-mail. All his questions were fully answered today. Bernadette Renee is in agreement with this plan. 11:16 AM  11:17 AM    Shantelle Bess ATC  [facility] [address]  [facility]    During this examination, I, Nobleporsha Jerez, functioned as a scribe for Dr. Jonas Harper. The history taking and physical examination were performed by Dr. Maura De Jesus. All counseling during the appointment was performed between the patient and Dr. Maura De Jesus. 2/22/2023  ______________________  I, Dr. Jonas Harper, personally performed the services described in this documentation as described by Shantelle Bess ATC in my presence, and it is both accurate and complete. Lori De Jesus MD, PhD  2/22/2023

## 2023-02-22 NOTE — LETTER
Physical Therapy Rehabilitation Referral    Patient Name:  Gatito Marshall      YOB: 1985    Diagnosis:    1. Status post total shoulder replacement, right    right hemiarthroplasty with a stemless humeral component with a ream and run procedure on 10/28/2022. Precautions:     [x] Evaluate and Treat    Post Op Instructions:  [] Continuous passive motion (CPM)  [x] Elbow ROM  [x] Exercise in plane of scapula  [x]  Strengthening     [x] Pulley and instruction   [x] Home exercise program (copy to patient)   [] Sling when arm at risk  [] Sling or brace at all times   [x] AAROM: Forward elevation to  full            [x] AAROM: External rotation  To  full    [] Isometric external rotator strengthening [x] AAROM: internal rotation: up the back  [x] Isometric abductor strengthening  [x] AAROM: Internal abduction   [] Isometric internal rotator strengthening [x] AAROM: cross-body adduction             Stretching:     Strengthening:  [x] Four quadrant (FE, ER, IR, CBA)  [x] Rotator cuff (ER, IR, Abd) (  [x] Forward Elevation    [x] External Rotators     [x] External Rotation    [x] Internal Rotators  [x] Internal Rotation: up/back   [x] Abductors     [x] Internal Rotation: supine in abduction  [x] Sleeper Stretch    [x] Flexors  [x] Cross-body abduction    [x] Extensors  [x] Pendulum (FE, Abd/Add, cw/ccw)  [x] Scapular Stabilizers   [x] Wall-walking (FE, Abd)        [x] Shoulder shrugs     [x] Table slides (FE)                [x] Rhomboid pinch  [] Elbow (flex, ext, pron, sup)        [] Lat.  Pull downs     [] Medial epicondylitis program       [] Forward punch   [] Lateral epicondylitis program       [] Internal rotators     [x] Progressive resistive exercises  [] Bench Press        [] Bench press plus  Activities:     [] Lateral pull-downs  [x] Rowing     [x] Progressive two-hand supine press  [] Stepper/Exercise bike   [x] Biceps: curls/supination  [] Swimming  [] Water exercises    Modalities:     Return to Sport:  [x] Of Choice      [x] Plyometrics  [] Ultrasound     [x] Rhythmic stabilization  [] Iontophoresis    [] Core strengthening   [] Moist heat     [] Sports specific program:   [] Massage         [x] Cryotherapy      [] Electrical stimulation     [] Paraffin  [] Whirlpool  [] TENS    [x] Home exercise program (copy to patient). Perform exercises for:   15     minutes    3      times/day  [x] Supervised physical therapy  Frequency: []  1x week  [x] 2x week  [] 3x week  [] Other:   Duration: [] 2 weeks   [] 4 weeks  [x] 6 weeks  [] Other:     Additional Instructions:   Transition to weight lifting workout. Bench press he may begin at 5 months postop. He should start with a close , higher repetitions with lower weights and progress as tolerated. Modified pushups and dips at 6 months postop. Lori Lemus MD, PhD

## 2023-11-01 ENCOUNTER — OFFICE VISIT (OUTPATIENT)
Dept: ORTHOPEDIC SURGERY | Age: 38
End: 2023-11-01
Payer: COMMERCIAL

## 2023-11-01 DIAGNOSIS — Z96.611 S/P SHOULDER HEMIARTHROPLASTY, RIGHT: Primary | ICD-10-CM

## 2023-11-01 PROCEDURE — 99213 OFFICE O/P EST LOW 20 MIN: CPT | Performed by: ORTHOPAEDIC SURGERY

## 2023-11-06 NOTE — PROGRESS NOTES
[address]  History and Physical  Shoulder Pain    Date:  2023    Name:  Flo Arrington  Address:  [address] [place] [address] [address] [id number]    :  1985      Age:   43 y.o.    SSN:  [id number]      Medical Record Number:  [id number]    Reason for Visit:    Shoulder Pain (CK RT shoulder )      HPI:   Flo Arrington is a 43 y.o. male who presents to our office today for follow up for a 1 year postop visit. This patient had a right hemiarthroplasty with glenoid ream and run procedure a year ago on 10/28/2022. Today the patient denies any pain with activity or at rest.  He reports overall that he continues to do well has no questions or concerns regarding the shoulder. He does not feel that the shoulder prevents him from doing any activities that he enjoys. He denies any instability. No notes on file    Review of Systems:  A 14 point review of systems available in the scanned medical record as documented by the patient and reviewed. The review is negative with the exception of those things mentioned in the History of Present Illness and Past Medical History. Past Medical History:  Patient's medications, allergies, past medical, surgical, social and family histories were reviewed and updated as appropriate. Allergies: Allergies   Allergen Reactions    Adhesive Tape Hives       Physical Exam:  There were no vitals filed for this visit. General: Flo Arrington is a healthy and well appearing 43 y.o. male who is sitting comfortably in our office in acute distress. Skin:  There are no skin lesions, cellulitis, or extreme edema. The patient has warm and well-perfused Bilateral upper extremities with brisk capillary refill. Eyes: Extra-ocular muscles intact  Mouth: Oral mucosa moist. No perioral lesions  Pulm: Respirations unlabored and regular.   Neuro: Alert and oriented x3    right Shoulder Exam:  Inspection:  No gross deformities, no signs of

## 2024-11-05 ENCOUNTER — OFFICE VISIT (OUTPATIENT)
Dept: ORTHOPEDIC SURGERY | Age: 39
End: 2024-11-05
Payer: COMMERCIAL

## 2024-11-05 VITALS — WEIGHT: 315 LBS | BODY MASS INDEX: 39.17 KG/M2 | HEIGHT: 75 IN

## 2024-11-05 DIAGNOSIS — Z96.611 STATUS POST TOTAL SHOULDER REPLACEMENT, RIGHT: Primary | ICD-10-CM

## 2024-11-05 DIAGNOSIS — Z96.611 S/P SHOULDER HEMIARTHROPLASTY, RIGHT: ICD-10-CM

## 2024-11-05 PROCEDURE — 99213 OFFICE O/P EST LOW 20 MIN: CPT | Performed by: ORTHOPAEDIC SURGERY

## 2024-11-05 RX ORDER — TESTOSTERONE CYPIONATE 200 MG/ML
INJECTION, SOLUTION INTRAMUSCULAR
COMMUNITY
Start: 2024-08-29

## 2024-11-05 NOTE — PROGRESS NOTES
[facility]  History and Physical  Shoulder Pain    Date:  2024    Name:  Terence Blake  Address:  [address] [place] [id number]    :  1985      Age:   39 y.o.    SSN:  [id number]      Medical Record Number:  [id number]    Reason for Visit:    Shoulder Pain (RIGHT SHOULDER)      HPI:   Terence Blake is a 39 y.o. male who presents to our office today for follow-up evaluation of his right shoulder.  He is currently 2 years status post right hemiarthroplasty with a glenoid ream and run procedure performed 10/28/2022.  Patient has been doing very well overall.  He does not report any pain with activity.  He has been able to do most of his activities with no issues.  He does not feel that his shoulder is preventing him from doing any of the activities he enjoys.  He denies any instability.       Pain Assessment  Location of Pain: Shoulder  Location Modifiers: Right  Severity of Pain: 0  Limiting Behavior: No  Relieving Factors: Rest  Work-Related Injury: No  Are there other pain locations you wish to document?: No    No notes on file    Review of Systems:  A 14 point review of systems available in the scanned medical record as documented by the patient and reviewed on 2024.  The review is negative with the exception of those things mentioned in the History of Present Illness and Past Medical History.      Past Medical History:  Patient's medications, allergies, past medical, surgical, social and family histories were reviewed and updated as appropriate.    Allergies:  Allergies   Allergen Reactions    Adhesive Tape Hives       Physical Exam:  There were no vitals filed for this visit.  General: Terence Blake is a healthy and well appearing 39 y.o. male who is sitting comfortably in our office in acute distress.     Skin:  There are no skin lesions, cellulitis, or extreme edema. The patient has warm and well-perfused Bilateral upper extremities with brisk

## (undated) DEVICE — TOTAL SHOULDER: Brand: MEDLINE INDUSTRIES, INC.

## (undated) DEVICE — INTENDED FOR TISSUE SEPARATION, AND OTHER PROCEDURES THAT REQUIRE A SHARP SURGICAL BLADE TO PUNCTURE OR CUT.: Brand: BARD-PARKER ® CARBON RIB-BACK BLADES

## (undated) DEVICE — GOWN,SIRUS,POLYRNF,BRTHSLV,XLN/XXL,18/CS: Brand: MEDLINE

## (undated) DEVICE — SUTURE VCRL SZ 0 L18IN ABSRB UD L36MM CT-1 1/2 CIR J840D

## (undated) DEVICE — DUAL CUT SAGITTAL BLADE

## (undated) DEVICE — NEEDLE SPNL L3.5IN PNK HUB S STL REG WALL FIT STYL W/ QNCKE

## (undated) DEVICE — SOLUTION IRRIG 3000ML 0.9% SOD CHL USP UROMATIC PLAS CONT

## (undated) DEVICE — SOLUTION IV IRRIG WATER 1000ML POUR BRL 2F7114

## (undated) DEVICE — ALTIVATE ANAT PT MATCHED GLEN DRL GUID

## (undated) DEVICE — UNDERGLOVE SURG SZ 8 BLU LTX FREE SYN POLYISOPRENE POLYMER

## (undated) DEVICE — NEEDLE SUT SZ 4 MAYO CATGUT 1/2 CIR TAPR PNT DISP

## (undated) DEVICE — GOWN,SIRUS,POLYRNF,BRTHSLV,XL,30/CS: Brand: MEDLINE

## (undated) DEVICE — SUTURE VCRL SZ 2-0 L18IN ABSRB UD CT-1 L36MM 1/2 CIR J839D

## (undated) DEVICE — SOLUTION IV 1000ML 0.9% SOD CHL

## (undated) DEVICE — GLOVE ORANGE PI 8   MSG9080

## (undated) DEVICE — SUTURE MCRYL SZ 4-0 L18IN ABSRB UD L19MM PS-2 3/8 CIR PRIM Y496G

## (undated) DEVICE — PIN DRVR L35IN BNE QUIK REL SMOOTH

## (undated) DEVICE — SYSTEM SKIN CLSR 22CM DERMBND PRINEO

## (undated) DEVICE — MAT FLR W32XL58IN

## (undated) DEVICE — GOWN,REINFRCE,POLY,ECLIPSE,SLV,3XLG: Brand: MEDLINE

## (undated) DEVICE — 3M™ IOBAN™ 2 ANTIMICROBIAL INCISE DRAPE 6640EZ: Brand: IOBAN™ 2

## (undated) DEVICE — TOWEL,STOP FLAG GOLD N-W: Brand: MEDLINE

## (undated) DEVICE — PIN GUIDE 3.2X152 MM STRL ALTIVATE ANAT CS EDGE

## (undated) DEVICE — 3 BONE CEMENT MIXER: Brand: MIXEVAC

## (undated) DEVICE — 3M™ COBAN™ NL STERILE NON-LATEX SELF-ADHERENT WRAP, 2086S, 6 IN X 5 YD (15 CM X 4,5 M), 12 ROLLS/CASE: Brand: 3M™ COBAN™

## (undated) DEVICE — SURGIFOAM SPNG SZ 100

## (undated) DEVICE — SUTURE ETHBND EXCEL SZ 2 L30IN NONABSORBABLE GRN L40MM V-37 MX69G